# Patient Record
Sex: FEMALE | Race: WHITE | HISPANIC OR LATINO | Employment: FULL TIME | URBAN - METROPOLITAN AREA
[De-identification: names, ages, dates, MRNs, and addresses within clinical notes are randomized per-mention and may not be internally consistent; named-entity substitution may affect disease eponyms.]

---

## 2018-03-14 NOTE — PROGRESS NOTES
MAY 3 2016         RE: Cristiansaida Chencho                              To: Ranjeet Hall   MR#: [id number]   : [address] [place] [address] Road: [id number]                             Fax: [phone]   (Exam #: [id number])      The LMP of this 23year old,  G4, P2-0-1-2 patient was unknown, her   working CHUNG is 2016 and the current gestational age is 33 weeks 6   days by 1st Trimester Sono  A sonographic examination was performed on MAY   3 2016 using real time equipment  The ultrasound examination was performed   using abdominal technique  The patient has a BMI of 31 4  Her blood   pressure today was 107/72  Earliest ultrasound found in her record: 11/19/15  6w1d 16 CHUNG         Carine is a  4 para   She is on prenatal vitamins and Zoloft   25 mg daily  She denies any known drug allergies  Her past medical history   is significant for depression, elevated BMI and active herpes and history   of alcohol and drug abuse  She denies use of alcoho/ and drugs in this   pregnancy  She denies smoking in this pregnancy but the [address] room smelled   like smoke  Her prior pregnancies weighed 7 lbs 13 oz and 7 lbs  15 oz    with her last delivery in   Her prior daughter was delivered at 37   weeks in  due to an ovarian mass found in utero  This tumor was   removed after delivery and it was benign per Carine but she is not sure   what the tumor was called  She also has a first trimester  by d/e  In this pregnancy she had a normal [place] screen and declined the MSAFP  She also declined a flu shot  Patient has a history of frequent UTIs but   has never seen urology and has had several episodes of pneumonia with her   last episode in   She reports she had 2 uti's this pregnancy  She   denies any first generation history of diabetes, HTN, thrombosis or other   fetal anomalies        Cardiac motion was observed at 142 bpm       INDICATIONS      fetal anatomical survey   late transfer   obesity   maternal herpes simplex virus   drug exposure      Exam Types      LEVEL II      RESULTS      Fetus # 1 of 1   Vertex presentation   Fetal growth appeared normal   Placenta Location = Posterior   No placenta previa   Placenta Grade = I      MEASUREMENTS (* Included In Average GA)      AC              26 1 cm        30 weeks 1 day * (51%)   BPD              7 8 cm        31 weeks 1 day * (64%)   HC              29 9 cm        32 weeks 5 days* (85%)   Femur            5 8 cm        30 weeks 3 days* (53%)      Humerus          5 3 cm        30 weeks 5 days  (65%)   Radius           4 4 cm        32 weeks 1 day   Ulna             4 8 cm        30 weeks 4 days   Tibia            4 8 cm        29 weeks 1 day   (34%)   Fibula           5 1 cm        29 weeks 3 days   Foot             6 0 cm        30 weeks 1 day      Cerebellum       3 7 cm        31 weeks 6 days   Biorbit          4 6 cm        29 weeks 0 days   CisternaMagna    7 4 mm      HC/AC           1 15   FL/AC           0 22   FL/BPD          0 75   EFW (Ac/Fl/Hc)  1629 grams - 3 lbs 9 oz                 (58%)      THE AVERAGE GESTATIONAL AGE is 31 weeks 1 day +/- 18 days  AMNIOTIC FLUID      Q1: 4 2      Q2: 3 2      Q3: 3 7      Q4: 2 9   SOFY Total = 13 9 cm   Amniotic Fluid: Normal      ANATOMY      Head                                    Normal   Face/Neck                               Normal   Th  Cav  Normal   Heart                                   Normal   Abd  Cav  Normal   Stomach                                 Normal   Right Kidney                            Normal   Left Kidney                             Normal   Bladder                                 Normal   Abd   Wall                               Normal   Spine                                   Normal   Extrems                                 Normal   Genitalia                               Normal   Placenta Normal   Umbl  Cord                              Normal   Uterus                                  Normal   PCI                                     Normal      ANATOMY DETAILS      Visualized Appearing Sonographically Normal:   HEAD: (Calvarium, BPD Level, Lateral Ventricles, Choroid Plexus,   Cerebellum, Cisterna Magna);    FACE/NECK: (Neck, Nuchal Fold, Profile,   Orbits, Nose/Lips, Palate, Face);    TH  CAV : (Diaphragm); HEART: (3   Vessel Trachea, Four Chamber View, Proximal Left Outflow, Proximal Right   Outflow, Aortic Arch, Ductal Arch, Short Axis of Greater Vessels, Cardiac   Axis, Interventricular Septum, Interatrial Septum, Cardiac Position);      ABD  CAV , STOMACH, RIGHT KIDNEY, LEFT KIDNEY, BLADDER, ABD  WALL, SPINE:   (Cervical Spine, Thoracic Spine, Lumbar Spine, Sacrum);    EXTREMS: (Lt   Humerus, Rt Humerus, Lt Forearm, Rt Forearm, Lt Hand, Rt Hand, Lt Femur,   Rt Femur, Lt Low Leg, Rt Low Leg, Lt Foot, Rt Foot);    GENITALIA (Male),   PLACENTA, UMBL  CORD, UTERUS, PCI      ANATOMY COMMENTS      No fetal structural abnormality or ultrasound marker for aneuploidy is   identified on the Level II ultrasound study today  The genitalia were   reviewed and found to be male  The patient is aware of the results of the   fetal sex  Fetal growth and amniotic fluid volume appear normal   Active   movement of the fetal body & extremities was seen  There is no suspicion   of a subchorionic bleed  The placental cord insertion was normal       ADNEXA      The left ovary appeared normal and measured 3 0 x 2 8 x 1 7 cm with a   volume of 7 5 cc  The right ovary appeared normal and measured 3 1 x 1 8 x   2 5 cm with a volume of 7 3 cc        IMPRESSION      Nava IUP   31 weeks and 1 day by this ultrasound  (CHUNG=JUL 4 2016)   29 weeks and 6 days by [address] [place] [address] Sono  (CHUNG=JUL 13 2016)   Vertex presentation   Fetal growth appeared normal   Normal anatomy survey   Regular fetal heart rate of 142 bpm   Posterior placenta   No placenta previa      GENERAL COMMENT      As per your request, a consultation was performed on your patient for the   following indication: size greater then dates      The patient was informed of the findings and counseled about the   limitations of the exam in detecting all forms of fetal congenital   abnormalities  She denies any vaginal bleeding or uterine cramping/contractions  She does   feel fetal movement  Follow up recommended:   1  Recommend valtrex suppression at 36 weeks  2  Could offer macrodantin suppression ( 50 mg qhs) for frequent UTIs  3  She had a diabetes screen but she is unsure of the results and I do not   have the results in her records sent  4  No further US are recommended at this time  DIPTI Boyer M D     Maternal-Fetal Medicine   Electronically signed 05/03/16 20:34

## 2019-09-23 ENCOUNTER — HOSPITAL ENCOUNTER (EMERGENCY)
Facility: HOSPITAL | Age: 29
Discharge: HOME | End: 2019-09-23
Attending: EMERGENCY MEDICINE | Admitting: EMERGENCY MEDICINE
Payer: COMMERCIAL

## 2019-09-23 VITALS
OXYGEN SATURATION: 97 % | TEMPERATURE: 98.5 F | SYSTOLIC BLOOD PRESSURE: 116 MMHG | HEART RATE: 73 BPM | RESPIRATION RATE: 18 BRPM | DIASTOLIC BLOOD PRESSURE: 61 MMHG

## 2019-09-23 DIAGNOSIS — L50.9 URTICARIA: ICD-10-CM

## 2019-09-23 DIAGNOSIS — R21 RASH AND NONSPECIFIC SKIN ERUPTION: ICD-10-CM

## 2019-09-23 PROCEDURE — 99284 EMERGENCY DEPT VISIT MOD MDM: CPT | Performed by: EMERGENCY MEDICINE

## 2019-09-23 PROCEDURE — 99282 EMERGENCY DEPT VISIT SF MDM: CPT

## 2019-09-23 RX ORDER — TRIAMCINOLONE ACETONIDE 5 MG/G
CREAM TOPICAL 3 TIMES DAILY
COMMUNITY
Start: 2019-09-21 | End: 2022-07-11 | Stop reason: ALTCHOICE

## 2019-09-23 RX ORDER — CLOTRIMAZOLE 1 %
CREAM (GRAM) TOPICAL 2 TIMES DAILY
COMMUNITY
Start: 2019-09-21 | End: 2022-07-11 | Stop reason: ALTCHOICE

## 2019-09-23 RX ORDER — PREDNISONE 20 MG/1
40 TABLET ORAL DAILY
Qty: 10 TABLET | Refills: 0 | Status: SHIPPED | OUTPATIENT
Start: 2019-09-23 | End: 2022-07-11 | Stop reason: ALTCHOICE

## 2019-09-23 RX ORDER — PREDNISONE 20 MG/1
40 TABLET ORAL ONCE
Status: COMPLETED | OUTPATIENT
Start: 2019-09-23 | End: 2019-09-23

## 2019-09-23 RX ADMIN — PREDNISONE 40 MG: 20 TABLET ORAL at 19:56

## 2019-09-23 NOTE — ED PROVIDER NOTES
History  Chief Complaint   Patient presents with    Rash     pt presents to ed with rash on right lower back, went to [facility] and has been using triamcinolone acetonide  pt reprts it not getting better and hurting     Pt presents to ED for evaluation of a pruritic erythematous rash which has been present for almost two weeks  She notes that it is not painful but it itches  She was prescribed triamcinolone cream which she has been applying to the rash without relief of sxs  She denies systemic illness  She denies known precipitant  She denies aggravating or alleviating factors  She has no h/o similar sxs in past  She is currently symptomatic  Prior to Admission Medications   Prescriptions Last Dose Informant Patient Reported? Taking? clotrimazole (LOTRIMIN) 1 % cream   Yes Yes   Sig: Apply topically 2 (two) times a day   triamcinolone (KENALOG) 0 5 % cream   Yes Yes   Sig: Apply topically Three times a day      Facility-Administered Medications: None       History reviewed  No pertinent past medical history  Past Surgical History:   Procedure Laterality Date    TUBAL LIGATION         History reviewed  No pertinent family history  I have reviewed and agree with the history as documented  Social History     Tobacco Use    Smoking status: Never Smoker    Smokeless tobacco: Never Used   Substance Use Topics    Alcohol use: No    Drug use: No        Review of Systems   Constitutional: Negative for chills and fever  Skin: Positive for rash and wound  All other systems reviewed and are negative  Physical Exam  Physical Exam   Constitutional: She is oriented to person, place, and time  She appears well-developed and well-nourished  No distress  HENT:   Head: Normocephalic and atraumatic  Eyes: Pupils are equal, round, and reactive to light  Conjunctivae and EOM are normal  Right eye exhibits no discharge  Left eye exhibits no discharge  Neck: Normal range of motion  Neck supple   No JVD present  Pulmonary/Chest: No stridor  Musculoskeletal: Normal range of motion  She exhibits no edema, tenderness or deformity  Neurological: She is alert and oriented to person, place, and time  No cranial nerve deficit or sensory deficit  She exhibits normal muscle tone  Coordination normal    Skin: Skin is warm and dry  Capillary refill takes less than 2 seconds  Rash noted  She is not diaphoretic  There is a scaling erythematous blanching raised rash on the lower R back  There is no surrounding cellulitis  There is no induration or crepitus or fluctuance  Nursing note and vitals reviewed  Vital Signs  ED Triage Vitals [09/23/19 1857]   Temperature Pulse Respirations Blood Pressure SpO2   98 5 °F (36 9 °C) 73 18 116/61 97 %      Temp Source Heart Rate Source Patient Position - Orthostatic VS BP Location FiO2 (%)   Oral Monitor Sitting Left arm --      Pain Score       No Pain           Vitals:    09/23/19 1857   BP: 116/61   Pulse: 73   Patient Position - Orthostatic VS: Sitting         Visual Acuity      ED Medications  Medications   predniSONE tablet 40 mg (40 mg Oral Given 9/23/19 1956)       Diagnostic Studies  Results Reviewed     None                 No orders to display              Procedures  Procedures       ED Course                               MDM  Number of Diagnoses or Management Options  Rash and nonspecific skin eruption:   Urticaria:   Diagnosis management comments: Itching urticarial rash  No relief from steroid cream  Will tx w systemic steroids   rted if new or worsening sxs, fever, chills, etc        Disposition  Final diagnoses:   Urticaria   Rash and nonspecific skin eruption     Time reflects when diagnosis was documented in both MDM as applicable and the Disposition within this note     Time User Action Codes Description Comment    9/23/2019  7:47 PM Alma Villalpando Add [L50 9] Urticaria     9/23/2019  7:47 PM Donta Mathis Add [R21] Rash and nonspecific skin eruption 9/23/2019  7:47 PM Magdalena García Modify [L50 9] Urticaria       ED Disposition     ED Disposition Condition Date/Time Comment    Discharge Stable Mon Sep 23, 2019  7:47 PM Damaris Pagan discharge to home/self care  Follow-up Information     Follow up With Specialties Details Why Contact Info Additional Information    [address] Emergency Department Emergency Medicine  If symptoms worsen [facility] ED, [address] [place] [address], [id number]          Discharge Medication List as of 9/23/2019  7:47 PM      START taking these medications    Details   predniSONE 20 mg tablet Take 2 tablets (40 mg total) by mouth daily, Starting Mon 9/23/2019, Print           No discharge procedures on file      ED Provider  Electronically Signed by           Kyle Acosta MD  09/23/19 7714

## 2021-09-19 ENCOUNTER — IMMUNIZATIONS (OUTPATIENT)
Dept: FAMILY MEDICINE CLINIC | Facility: HOSPITAL | Age: 31
End: 2021-09-19

## 2021-09-19 DIAGNOSIS — Z23 ENCOUNTER FOR IMMUNIZATION: Primary | ICD-10-CM

## 2021-09-19 PROCEDURE — 91300 SARS-COV-2 / COVID-19 MRNA VACCINE (PFIZER-BIONTECH) 30 MCG: CPT

## 2021-09-19 PROCEDURE — 0001A SARS-COV-2 / COVID-19 MRNA VACCINE (PFIZER-BIONTECH) 30 MCG: CPT

## 2021-10-19 ENCOUNTER — IMMUNIZATIONS (OUTPATIENT)
Dept: FAMILY MEDICINE CLINIC | Facility: HOSPITAL | Age: 31
End: 2021-10-19

## 2021-10-19 DIAGNOSIS — Z23 ENCOUNTER FOR IMMUNIZATION: Primary | ICD-10-CM

## 2021-10-19 PROCEDURE — 91300 SARS-COV-2 / COVID-19 MRNA VACCINE (PFIZER-BIONTECH) 30 MCG: CPT

## 2021-10-19 PROCEDURE — 0002A SARS-COV-2 / COVID-19 MRNA VACCINE (PFIZER-BIONTECH) 30 MCG: CPT

## 2021-11-03 ENCOUNTER — TELEPHONE (OUTPATIENT)
Dept: FAMILY MEDICINE CLINIC | Facility: CLINIC | Age: 31
End: 2021-11-03

## 2022-04-12 ENCOUNTER — TELEPHONE (OUTPATIENT)
Dept: PSYCHIATRY | Facility: CLINIC | Age: 32
End: 2022-04-12

## 2022-06-14 ENCOUNTER — HOSPITAL ENCOUNTER (EMERGENCY)
Facility: HOSPITAL | Age: 32
Discharge: HOME | End: 2022-06-14
Attending: EMERGENCY MEDICINE
Payer: COMMERCIAL

## 2022-06-14 VITALS
HEIGHT: 64 IN | TEMPERATURE: 97.4 F | OXYGEN SATURATION: 99 % | RESPIRATION RATE: 20 BRPM | SYSTOLIC BLOOD PRESSURE: 118 MMHG | HEART RATE: 67 BPM | BODY MASS INDEX: 31.58 KG/M2 | DIASTOLIC BLOOD PRESSURE: 76 MMHG | WEIGHT: 185 LBS

## 2022-06-14 DIAGNOSIS — L03.90 CELLULITIS: Primary | ICD-10-CM

## 2022-06-14 PROCEDURE — 99284 EMERGENCY DEPT VISIT MOD MDM: CPT | Performed by: EMERGENCY MEDICINE

## 2022-06-14 PROCEDURE — 99283 EMERGENCY DEPT VISIT LOW MDM: CPT

## 2022-06-14 RX ORDER — CEPHALEXIN 250 MG/1
500 CAPSULE ORAL ONCE
Status: COMPLETED | OUTPATIENT
Start: 2022-06-14 | End: 2022-06-14

## 2022-06-14 RX ORDER — CEPHALEXIN 250 MG/1
500 CAPSULE ORAL 4 TIMES DAILY
Qty: 56 CAPSULE | Refills: 0 | Status: SHIPPED | OUTPATIENT
Start: 2022-06-14 | End: 2022-06-21

## 2022-06-14 RX ORDER — FLUCONAZOLE 150 MG/1
150 TABLET ORAL ONCE
Status: COMPLETED | OUTPATIENT
Start: 2022-06-14 | End: 2022-06-14

## 2022-06-14 RX ADMIN — CEPHALEXIN 500 MG: 250 CAPSULE ORAL at 11:18

## 2022-06-14 RX ADMIN — FLUCONAZOLE 150 MG: 150 TABLET ORAL at 11:18

## 2022-06-14 NOTE — ED PROVIDER NOTES
History  Chief Complaint   Patient presents with    Abscess     Pt presents with c/o abscess in groin, noticed 2 month ago      HPI  28 yo F presents with R buttocks pain/swelling for the past month which has been worsening, aching and constant  She has tried compresses without improvement  No fevers or chills  Prior to Admission Medications   Prescriptions Last Dose Informant Patient Reported? Taking? clotrimazole (LOTRIMIN) 1 % cream   Yes No   Sig: Apply topically 2 (two) times a day   predniSONE 20 mg tablet   No No   Sig: Take 2 tablets (40 mg total) by mouth daily   triamcinolone (KENALOG) 0 5 % cream   Yes No   Sig: Apply topically Three times a day      Facility-Administered Medications: None       History reviewed  No pertinent past medical history  Past Surgical History:   Procedure Laterality Date    TUBAL LIGATION         History reviewed  No pertinent family history  I have reviewed and agree with the history as documented  E-Cigarette/Vaping     E-Cigarette/Vaping Substances     Social History     Tobacco Use    Smoking status: Never Smoker    Smokeless tobacco: Never Used   Substance Use Topics    Alcohol use: No    Drug use: No       Review of Systems   Constitutional: Negative for chills and fever  HENT: Negative for dental problem and ear pain  Eyes: Negative for pain and redness  Respiratory: Negative for cough and shortness of breath  Cardiovascular: Negative for chest pain and palpitations  Gastrointestinal: Negative for abdominal pain and nausea  Endocrine: Negative for polydipsia and polyphagia  Genitourinary: Negative for dysuria and frequency  Musculoskeletal: Negative for arthralgias and joint swelling  Skin: Negative for color change and rash  +buttocks pain and swelling   Neurological: Negative for dizziness and headaches  Psychiatric/Behavioral: Negative for behavioral problems and confusion     All other systems reviewed and are negative  Physical Exam  Physical Exam  Vitals and nursing note reviewed  Constitutional:       General: She is not in acute distress  HENT:      Head: Normocephalic and atraumatic  Right Ear: External ear normal       Left Ear: External ear normal       Nose: Nose normal    Eyes:      General: No scleral icterus  Cardiovascular:      Rate and Rhythm: Normal rate  Pulmonary:      Effort: Pulmonary effort is normal  No respiratory distress  Abdominal:      General: There is no distension  Musculoskeletal:         General: No deformity  Normal range of motion  Comments: R inferior buttocks with edema and tenderness, no induration or fluctuance   Skin:     Findings: No rash  Neurological:      General: No focal deficit present  Mental Status: She is alert  Gait: Gait normal    Psychiatric:         Mood and Affect: Mood normal          Vital Signs  ED Triage Vitals [06/14/22 1002]   Temperature Pulse Respirations Blood Pressure SpO2   (!) 97 4 °F (36 3 °C) 67 20 118/76 99 %      Temp Source Heart Rate Source Patient Position - Orthostatic VS BP Location FiO2 (%)   Oral Monitor Sitting Left arm --      Pain Score       --           Vitals:    06/14/22 1002   BP: 118/76   Pulse: 67   Patient Position - Orthostatic VS: Sitting         Visual Acuity      ED Medications  Medications   cephalexin (KEFLEX) capsule 500 mg (500 mg Oral Given 6/14/22 1118)   fluconazole (DIFLUCAN) tablet 150 mg (150 mg Oral Given 6/14/22 1118)       Diagnostic Studies  Results Reviewed     None                 No orders to display              Procedures  Procedures         ED Course                               SBIRT 22yo+    Flowsheet Row Most Recent Value   SBIRT (23 yo +)    In order to provide better care to our patients, we are screening all of our patients for alcohol and drug use  Would it be okay to ask you these screening questions?  Yes Filed at: 06/14/2022 1102   Initial Alcohol Screen: US AUDIT-C 1  How often do you have a drink containing alcohol? 1 Filed at: 06/14/2022 1102   2  How many drinks containing alcohol do you have on a typical day you are drinking? 1 Filed at: 06/14/2022 1102   3b  FEMALE Any Age, or MALE 65+: How often do you have 4 or more drinks on one occassion? 0 Filed at: 06/14/2022 1102   Audit-C Score 2 Filed at: 06/14/2022 1102   LOUIE: How many times in the past year have you    Used an illegal drug or used a prescription medication for non-medical reasons? Never Filed at: 06/14/2022 1102                    MDM  Patient presents with buttocks swelling  Bedside US shows cobblestoning of tissue but no fluid pocket, will defer incision and drainage at this time and treat for cellulitis  Disposition  Final diagnoses:   Cellulitis     Time reflects when diagnosis was documented in both MDM as applicable and the Disposition within this note     Time User Action Codes Description Comment    6/14/2022 11:11 AM Freida Wang [L03 90] Cellulitis       ED Disposition     ED Disposition   Discharge    Condition   Stable    Date/Time   Tue Jun 14, 2022 11:11 AM    Comment   Jl Mcmahon discharge to home/self care                 Follow-up Information     Follow up With Specialties Details Why Contact Info    Hafsa Arechiga MD Internal Medicine Schedule an appointment as soon as possible for a visit   [address] [place] [address]  [phone]            Discharge Medication List as of 6/14/2022 11:12 AM      START taking these medications    Details   cephalexin (KEFLEX) 250 mg capsule Take 2 capsules (500 mg total) by mouth 4 (four) times a day for 7 days, Starting Tue 6/14/2022, Until Tue 6/21/2022, Print         CONTINUE these medications which have NOT CHANGED    Details   clotrimazole (LOTRIMIN) 1 % cream Apply topically 2 (two) times a day, Starting Sat 9/21/2019, Until Sat 10/5/2019, Historical Med      predniSONE 20 mg tablet Take 2 tablets (40 mg total) by mouth daily, Starting Mon 9/23/2019, Print      triamcinolone (KENALOG) 0 5 % cream Apply topically Three times a day, Starting Sat 9/21/2019, Until Sat 10/5/2019, Historical Med             No discharge procedures on file      PDMP Review     None          ED Provider  Electronically Signed by           Carli Kincaid MD  06/14/22 3085

## 2022-06-14 NOTE — DISCHARGE INSTRUCTIONS
Take antibiotics as prescribed and continue with compresses, follow up with primary care doctor for recheck

## 2022-06-28 ENCOUNTER — OFFICE VISIT (OUTPATIENT)
Dept: INTERNAL MEDICINE CLINIC | Facility: CLINIC | Age: 32
End: 2022-06-28
Payer: COMMERCIAL

## 2022-06-28 VITALS
BODY MASS INDEX: 29.09 KG/M2 | TEMPERATURE: 97.8 F | WEIGHT: 170.4 LBS | DIASTOLIC BLOOD PRESSURE: 72 MMHG | RESPIRATION RATE: 18 BRPM | HEIGHT: 64 IN | HEART RATE: 67 BPM | SYSTOLIC BLOOD PRESSURE: 114 MMHG | OXYGEN SATURATION: 97 %

## 2022-06-28 DIAGNOSIS — Z23 ENCOUNTER FOR IMMUNIZATION: ICD-10-CM

## 2022-06-28 DIAGNOSIS — Z12.4 SCREENING FOR CERVICAL CANCER: ICD-10-CM

## 2022-06-28 DIAGNOSIS — L72.9 CYST OF BUTTOCKS: Primary | ICD-10-CM

## 2022-06-28 DIAGNOSIS — D17.24 LIPOMA OF LEFT LOWER EXTREMITY: ICD-10-CM

## 2022-06-28 PROCEDURE — 99214 OFFICE O/P EST MOD 30 MIN: CPT

## 2022-06-28 PROCEDURE — 1036F TOBACCO NON-USER: CPT

## 2022-06-28 PROCEDURE — 3008F BODY MASS INDEX DOCD: CPT

## 2022-06-28 RX ORDER — CETIRIZINE HYDROCHLORIDE 10 MG/1
10 TABLET ORAL DAILY
COMMUNITY
Start: 2022-05-17 | End: 2022-07-11 | Stop reason: ALTCHOICE

## 2022-06-28 RX ORDER — MULTIVIT-MIN/IRON FUM/FOLIC AC 7.5 MG-4
1 TABLET ORAL DAILY
COMMUNITY

## 2022-06-28 RX ORDER — CEPHALEXIN 250 MG/1
CAPSULE ORAL
COMMUNITY
Start: 2022-06-14 | End: 2022-07-11 | Stop reason: ALTCHOICE

## 2022-06-28 NOTE — PROGRESS NOTES
INTERNAL MEDICINE FOLLOW-UP VISIT  [facility]    NAME: James Torres  AGE: 29 y o  SEX: female  : 1990     DATE: 2022     Assessment and Plan:   1  Cyst of buttocks  2 week hx of fluid filled sac to right groin/buttock region  Denies any pressure or trauma to this area  Was treated at urgent care with Keflex with no improvement  - US extremity soft tissue; Future        No follow-ups on file  Chief Complaint:     Chief Complaint   Patient presents with    Establish Care     Cyst on the inner thigh      History of Present Illness:     Patient presents with a 2 week history of a fluid filled lesion to her right groin/ outer buttock region  She states that it is tender to the touch  She denies any trauma to this area  She went to urgent care and was placed on keflex with no improvement  The following portions of the patient's history were reviewed and updated as appropriate: allergies, current medications, past family history, past medical history, past social history, past surgical history and problem list      Review of Systems:     Review of Systems   Constitutional: Negative for appetite change, chills, diaphoresis, fatigue, fever and unexpected weight change  HENT: Negative for postnasal drip and sneezing  Eyes: Negative for visual disturbance  Respiratory: Negative for chest tightness and shortness of breath  Cardiovascular: Negative for chest pain, palpitations and leg swelling  Gastrointestinal: Negative for abdominal pain and blood in stool  Endocrine: Negative for cold intolerance, heat intolerance, polydipsia, polyphagia and polyuria  Genitourinary: Negative for difficulty urinating, dysuria, frequency and urgency  Musculoskeletal: Negative for arthralgias and myalgias  Skin: Positive for wound  Negative for rash  Neurological: Negative for dizziness, weakness, light-headedness and headaches  Hematological: Negative for adenopathy  Psychiatric/Behavioral: Negative for confusion, dysphoric mood and sleep disturbance  The patient is not nervous/anxious  Past Medical History:   History reviewed  No pertinent past medical history  Current Medications:     Current Outpatient Medications:     cephalexin (KEFLEX) 250 mg capsule, , Disp: , Rfl:     cetirizine (ZyrTEC) 10 mg tablet, Take 10 mg by mouth daily, Disp: , Rfl:     Multiple Vitamins-Minerals (multivitamin with minerals) tablet, Take 1 tablet by mouth daily, Disp: , Rfl:     clotrimazole (LOTRIMIN) 1 % cream, Apply topically 2 (two) times a day (Patient not taking: Reported on 6/28/2022), Disp: , Rfl:     predniSONE 20 mg tablet, Take 2 tablets (40 mg total) by mouth daily (Patient not taking: No sig reported), Disp: 10 tablet, Rfl: 0    triamcinolone (KENALOG) 0 5 % cream, Apply topically Three times a day (Patient not taking: Reported on 6/28/2022), Disp: , Rfl:      Allergies:   No Known Allergies     Physical Exam:     /72 (BP Location: Left arm, Patient Position: Sitting, Cuff Size: Standard)   Pulse 67   Temp 97 8 °F (36 6 °C) (Temporal) Comment: no nsaids  Resp 18   Ht 5' 4" (1 626 m)   Wt 77 3 kg (170 lb 6 4 oz)   SpO2 97%   BMI 29 25 kg/m²     Physical Exam  Constitutional:       Appearance: She is well-developed  HENT:      Head: Normocephalic and atraumatic  Eyes:      Pupils: Pupils are equal, round, and reactive to light  Neck:      Thyroid: No thyromegaly  Cardiovascular:      Rate and Rhythm: Normal rate and regular rhythm  Heart sounds: No murmur heard  Pulmonary:      Effort: Pulmonary effort is normal       Breath sounds: Normal breath sounds  Abdominal:      General: Bowel sounds are normal       Palpations: Abdomen is soft  Musculoskeletal:         General: Normal range of motion  Cervical back: Normal range of motion and neck supple     Lymphadenopathy:      Cervical: No cervical adenopathy  Skin:     General: Skin is warm and dry  Findings: Lesion present  Comments: Right groin inner buttock region   Neurological:      Mental Status: She is alert and oriented to person, place, and time            LEI Arana  [facility] [address] [place] [address]

## 2022-06-28 NOTE — LETTER
June 29, 2022     Patient: Ami Allen  YOB: 1990  Date of Visit: 6/28/2022      To Whom it May Concern:    Ami Allen is under my professional care  Marcello He was seen in my office on 6/28/2022  Marcello He may return to work on 06/30/2022  If you have any questions or concerns, please don't hesitate to call           Sincerely,          LEI Bullock        CC: No Recipients

## 2022-06-29 ENCOUNTER — APPOINTMENT (OUTPATIENT)
Dept: LAB | Facility: CLINIC | Age: 32
End: 2022-06-29
Payer: COMMERCIAL

## 2022-06-29 ENCOUNTER — HOSPITAL ENCOUNTER (OUTPATIENT)
Dept: ULTRASOUND IMAGING | Facility: HOSPITAL | Age: 32
Discharge: HOME | End: 2022-06-29
Payer: COMMERCIAL

## 2022-06-29 DIAGNOSIS — N89.8 CYST OF VAGINA: ICD-10-CM

## 2022-06-29 DIAGNOSIS — L72.9 CYST OF BUTTOCKS: ICD-10-CM

## 2022-06-29 DIAGNOSIS — D17.23 LIPOMA OF RIGHT LOWER EXTREMITY: ICD-10-CM

## 2022-06-29 DIAGNOSIS — L72.9 CYST OF BUTTOCKS: Primary | ICD-10-CM

## 2022-06-29 LAB
BASOPHILS # BLD AUTO: 0.07 THOUSANDS/ΜL (ref 0–0.1)
BASOPHILS NFR BLD AUTO: 1 % (ref 0–1)
CRP SERPL QL: <3 MG/L
EOSINOPHIL # BLD AUTO: 0.1 THOUSAND/ΜL (ref 0–0.61)
EOSINOPHIL NFR BLD AUTO: 1 % (ref 0–6)
ERYTHROCYTE [DISTWIDTH] IN BLOOD BY AUTOMATED COUNT: 11.9 % (ref 11.6–15.1)
ERYTHROCYTE [SEDIMENTATION RATE] IN BLOOD: 12 MM/HOUR (ref 0–19)
HCT VFR BLD AUTO: 38.2 % (ref 34.8–46.1)
HGB BLD-MCNC: 12.5 G/DL (ref 11.5–15.4)
IMM GRANULOCYTES # BLD AUTO: 0.03 THOUSAND/UL (ref 0–0.2)
IMM GRANULOCYTES NFR BLD AUTO: 0 % (ref 0–2)
LYMPHOCYTES # BLD AUTO: 1.88 THOUSANDS/ΜL (ref 0.6–4.47)
LYMPHOCYTES NFR BLD AUTO: 23 % (ref 14–44)
MCH RBC QN AUTO: 32.8 PG (ref 26.8–34.3)
MCHC RBC AUTO-ENTMCNC: 32.7 G/DL (ref 31.4–37.4)
MCV RBC AUTO: 100 FL (ref 82–98)
MONOCYTES # BLD AUTO: 0.78 THOUSAND/ΜL (ref 0.17–1.22)
MONOCYTES NFR BLD AUTO: 10 % (ref 4–12)
NEUTROPHILS # BLD AUTO: 5.3 THOUSANDS/ΜL (ref 1.85–7.62)
NEUTS SEG NFR BLD AUTO: 65 % (ref 43–75)
NRBC BLD AUTO-RTO: 0 /100 WBCS
PLATELET # BLD AUTO: 384 THOUSANDS/UL (ref 149–390)
PMV BLD AUTO: 9.2 FL (ref 8.9–12.7)
RBC # BLD AUTO: 3.81 MILLION/UL (ref 3.81–5.12)
WBC # BLD AUTO: 8.16 THOUSAND/UL (ref 4.31–10.16)

## 2022-06-29 PROCEDURE — 80048 BASIC METABOLIC PNL TOTAL CA: CPT

## 2022-06-29 PROCEDURE — 76882 US LMTD JT/FCL EVL NVASC XTR: CPT

## 2022-06-29 PROCEDURE — 86140 C-REACTIVE PROTEIN: CPT

## 2022-06-29 PROCEDURE — 36415 COLL VENOUS BLD VENIPUNCTURE: CPT

## 2022-06-29 PROCEDURE — 85652 RBC SED RATE AUTOMATED: CPT

## 2022-06-29 PROCEDURE — 85025 COMPLETE CBC W/AUTO DIFF WBC: CPT

## 2022-06-30 ENCOUNTER — TELEPHONE (OUTPATIENT)
Dept: INTERNAL MEDICINE CLINIC | Facility: CLINIC | Age: 32
End: 2022-06-30

## 2022-07-01 ENCOUNTER — CONSULT (OUTPATIENT)
Dept: SURGERY | Facility: CLINIC | Age: 32
End: 2022-07-01
Payer: COMMERCIAL

## 2022-07-01 VITALS
HEART RATE: 71 BPM | WEIGHT: 175 LBS | TEMPERATURE: 98.5 F | OXYGEN SATURATION: 99 % | BODY MASS INDEX: 29.88 KG/M2 | DIASTOLIC BLOOD PRESSURE: 76 MMHG | SYSTOLIC BLOOD PRESSURE: 118 MMHG | HEIGHT: 64 IN

## 2022-07-01 DIAGNOSIS — D17.23 LIPOMA OF RIGHT LOWER EXTREMITY: Primary | ICD-10-CM

## 2022-07-01 LAB
ANION GAP SERPL CALCULATED.3IONS-SCNC: 6 MMOL/L (ref 4–13)
BUN SERPL-MCNC: 13 MG/DL (ref 5–25)
CALCIUM SERPL-MCNC: 8.8 MG/DL (ref 8.3–10.1)
CHLORIDE SERPL-SCNC: 106 MMOL/L (ref 100–108)
CO2 SERPL-SCNC: 27 MMOL/L (ref 21–32)
CREAT SERPL-MCNC: 0.56 MG/DL (ref 0.6–1.3)
GFR SERPL CREATININE-BSD FRML MDRD: 125 ML/MIN/1.73SQ M
GLUCOSE SERPL-MCNC: 66 MG/DL (ref 65–140)
POTASSIUM SERPL-SCNC: 4 MMOL/L (ref 3.5–5.3)
SODIUM SERPL-SCNC: 139 MMOL/L (ref 136–145)

## 2022-07-01 PROCEDURE — 99244 OFF/OP CNSLTJ NEW/EST MOD 40: CPT | Performed by: STUDENT IN AN ORGANIZED HEALTH CARE EDUCATION/TRAINING PROGRAM

## 2022-07-01 RX ORDER — HEPARIN SODIUM 5000 [USP'U]/ML
5000 INJECTION, SOLUTION INTRAVENOUS; SUBCUTANEOUS ONCE
Status: CANCELLED | OUTPATIENT
Start: 2022-07-01 | End: 2022-07-01

## 2022-07-01 RX ORDER — CHLORHEXIDINE GLUCONATE 4 G/100ML
SOLUTION TOPICAL DAILY PRN
Status: CANCELLED | OUTPATIENT
Start: 2022-07-01

## 2022-07-01 NOTE — H&P (VIEW-ONLY)
Assessment/Plan:  82-year-old female with lipoma of right proximal medial thigh  -patient presents with mass of proximal medial thigh  -states 3 weeks ago she noticed a bump on her proximal medial thigh that caused her discomfort, noted it initially after working out and running  -states any time that it is warm and with physical activity this causes her discomfort, most likely from friction between the mass and her other thigh  -denies any drainage  -patient also states she notices that her right leg will intermittently give out from time to time, this occurs randomly  - on exam there is a roughly 6 x 5 cm subcutaneous mass which is freely mobile in the subcutaneous tissue, palpation of the mass does not cause any lower extremity symptoms  -ultrasound of the right thigh from 06/29/2022 report and images reviewed, this was also reviewed with the patient  -CMP from 06/29/2022 reviewed  -primary care physician note from 06/28/2022 reviewed  -will plan for excision right proximal thigh lipoma under general anesthesia/LMA  -BMP ordered, can add this on to pre-existing blood work  -I do not believe the proximal thigh lipoma is causing the patient's right leg to give out, a mass is noted to be in the subcutaneous tissue and on ultrasound does not appear to go any deeper, palpation of the mass does not reproduce any lower extremity symptoms    All risks, benefits, alternatives of the procedure were discussed at length with the patient  Risks include bleeding, infection, damage to surrounding structures, recurrence  All questions were answered to satisfaction  The patient voiced understanding and signed consent  1  Lipoma of right lower extremity  -     Ambulatory Referral to General Surgery  -     Case request operating room: EXCISION BIOPSY TISSUE LESION/MASS LOWER EXTREMITY; Standing  -     Basic metabolic panel;  Future  -     Case request operating room: EXCISION BIOPSY TISSUE LESION/MASS LOWER EXTREMITY Subjective:      Patient ID: Kenny Ybarra is a 29 y o  female  Triage Notes:    Patient is a 82-year-old female who presents to office for evaluation of proximal right medial thigh mass  Patient states roughly 3 weeks ago she noticed a mass on her inner thigh  She was working out and noticed it caused discomfort with her running  She also states she notices when it is very warm and with movement the mass causes friction and pain  It does not cause her discomfort if it is not moved  Patient states she does intermittently feel like her right leg gives out but this happens intermittently and is not associated with palpation of the mass  The following portions of the patient's history were reviewed and updated as appropriate:   She  has no past medical history on file  She   Patient Active Problem List    Diagnosis Date Noted    Lipoma of right lower extremity 07/01/2022    Cyst of buttocks 06/28/2022     She  has a past surgical history that includes Tubal ligation  Her family history is not on file  She  reports that she has never smoked  She has never used smokeless tobacco  She reports current alcohol use  She reports that she does not use drugs    Current Outpatient Medications on File Prior to Visit   Medication Sig    Multiple Vitamins-Minerals (multivitamin with minerals) tablet Take 1 tablet by mouth daily    cephalexin (KEFLEX) 250 mg capsule  (Patient not taking: Reported on 7/1/2022)    cetirizine (ZyrTEC) 10 mg tablet Take 10 mg by mouth daily (Patient not taking: Reported on 7/1/2022)    clotrimazole (LOTRIMIN) 1 % cream Apply topically 2 (two) times a day (Patient not taking: Reported on 6/28/2022)    predniSONE 20 mg tablet Take 2 tablets (40 mg total) by mouth daily (Patient not taking: No sig reported)    triamcinolone (KENALOG) 0 5 % cream Apply topically Three times a day (Patient not taking: Reported on 6/28/2022)     No current facility-administered medications on file prior to visit  She has No Known Allergies       Review of Systems   Constitutional: Negative for chills, fatigue and fever  HENT: Negative for congestion, hearing loss, rhinorrhea and sore throat  Eyes: Negative for pain and discharge  Respiratory: Negative for cough, chest tightness and shortness of breath  Cardiovascular: Negative for chest pain and palpitations  Gastrointestinal: Negative for abdominal pain, constipation, diarrhea, nausea and vomiting  Endocrine: Negative for cold intolerance and heat intolerance  Genitourinary: Negative for difficulty urinating and dysuria  Musculoskeletal: Negative for back pain and neck pain  Positive proximal right thigh mass   Skin: Negative for color change and rash  Allergic/Immunologic: Negative for environmental allergies and food allergies  Neurological: Negative for seizures and headaches  Hematological: Negative for adenopathy  Does not bruise/bleed easily  Psychiatric/Behavioral: Negative for confusion and hallucinations  Objective:      /76 (BP Location: Left arm, Patient Position: Sitting, Cuff Size: Adult)   Pulse 71   Temp 98 5 °F (36 9 °C) (Oral)   Ht 5' 4" (1 626 m)   Wt 79 4 kg (175 lb)   SpO2 99%   BMI 30 04 kg/m²     Below is the patient's most recent value for Albumin, ALT, AST, BUN, Calcium, Chloride, Cholesterol, CO2, Creatinine, GFR, Glucose, HDL, Hematocrit, Hemoglobin, Hemoglobin A1C, LDL, Magnesium, Phosphorus, Platelets, Potassium, PSA, Sodium, Triglycerides, and WBC  Lab Results   Component Value Date    HCT 38 2 06/29/2022    HGB 12 5 06/29/2022     06/29/2022    WBC 8 16 06/29/2022     Note: for a comprehensive list of the patient's lab results, access the Results Review activity  Physical Exam  Exam conducted with a chaperone present (NIKIA Delgado)  Constitutional:       Appearance: Normal appearance  HENT:      Head: Normocephalic and atraumatic        Nose: Nose normal    Eyes:      General: No scleral icterus  Conjunctiva/sclera: Conjunctivae normal    Cardiovascular:      Rate and Rhythm: Normal rate and regular rhythm  Heart sounds: Normal heart sounds  Pulmonary:      Effort: Pulmonary effort is normal       Breath sounds: Normal breath sounds  Abdominal:      General: There is no distension  Palpations: Abdomen is soft  Tenderness: There is no abdominal tenderness  Musculoskeletal:         General: No signs of injury  Skin:     General: Skin is warm  Coloration: Skin is not jaundiced  Comments: There is a subcutaneous mass in the right proximal medial thigh which is roughly 6 x 5 cm, no signs of infection, this is freely mobile in the subcutaneous tissue   Neurological:      General: No focal deficit present  Mental Status: She is alert and oriented to person, place, and time     Psychiatric:         Mood and Affect: Mood normal          Behavior: Behavior normal

## 2022-07-01 NOTE — PROGRESS NOTES
Assessment/Plan:  82-year-old female with lipoma of right proximal medial thigh  -patient presents with mass of proximal medial thigh  -states 3 weeks ago she noticed a bump on her proximal medial thigh that caused her discomfort, noted it initially after working out and running  -states any time that it is warm and with physical activity this causes her discomfort, most likely from friction between the mass and her other thigh  -denies any drainage  -patient also states she notices that her right leg will intermittently give out from time to time, this occurs randomly  - on exam there is a roughly 6 x 5 cm subcutaneous mass which is freely mobile in the subcutaneous tissue, palpation of the mass does not cause any lower extremity symptoms  -ultrasound of the right thigh from 06/29/2022 report and images reviewed, this was also reviewed with the patient  -CMP from 06/29/2022 reviewed  -primary care physician note from 06/28/2022 reviewed  -will plan for excision right proximal thigh lipoma under general anesthesia/LMA  -BMP ordered, can add this on to pre-existing blood work  -I do not believe the proximal thigh lipoma is causing the patient's right leg to give out, a mass is noted to be in the subcutaneous tissue and on ultrasound does not appear to go any deeper, palpation of the mass does not reproduce any lower extremity symptoms    All risks, benefits, alternatives of the procedure were discussed at length with the patient  Risks include bleeding, infection, damage to surrounding structures, recurrence  All questions were answered to satisfaction  The patient voiced understanding and signed consent  1  Lipoma of right lower extremity  -     Ambulatory Referral to General Surgery  -     Case request operating room: EXCISION BIOPSY TISSUE LESION/MASS LOWER EXTREMITY; Standing  -     Basic metabolic panel;  Future  -     Case request operating room: EXCISION BIOPSY TISSUE LESION/MASS LOWER EXTREMITY Subjective:      Patient ID: Hattie Patel is a 29 y o  female  Triage Notes:    Patient is a 70-year-old female who presents to office for evaluation of proximal right medial thigh mass  Patient states roughly 3 weeks ago she noticed a mass on her inner thigh  She was working out and noticed it caused discomfort with her running  She also states she notices when it is very warm and with movement the mass causes friction and pain  It does not cause her discomfort if it is not moved  Patient states she does intermittently feel like her right leg gives out but this happens intermittently and is not associated with palpation of the mass  The following portions of the patient's history were reviewed and updated as appropriate:   She  has no past medical history on file  She   Patient Active Problem List    Diagnosis Date Noted    Lipoma of right lower extremity 07/01/2022    Cyst of buttocks 06/28/2022     She  has a past surgical history that includes Tubal ligation  Her family history is not on file  She  reports that she has never smoked  She has never used smokeless tobacco  She reports current alcohol use  She reports that she does not use drugs    Current Outpatient Medications on File Prior to Visit   Medication Sig    Multiple Vitamins-Minerals (multivitamin with minerals) tablet Take 1 tablet by mouth daily    cephalexin (KEFLEX) 250 mg capsule  (Patient not taking: Reported on 7/1/2022)    cetirizine (ZyrTEC) 10 mg tablet Take 10 mg by mouth daily (Patient not taking: Reported on 7/1/2022)    clotrimazole (LOTRIMIN) 1 % cream Apply topically 2 (two) times a day (Patient not taking: Reported on 6/28/2022)    predniSONE 20 mg tablet Take 2 tablets (40 mg total) by mouth daily (Patient not taking: No sig reported)    triamcinolone (KENALOG) 0 5 % cream Apply topically Three times a day (Patient not taking: Reported on 6/28/2022)     No current facility-administered medications on file prior to visit  She has No Known Allergies       Review of Systems   Constitutional: Negative for chills, fatigue and fever  HENT: Negative for congestion, hearing loss, rhinorrhea and sore throat  Eyes: Negative for pain and discharge  Respiratory: Negative for cough, chest tightness and shortness of breath  Cardiovascular: Negative for chest pain and palpitations  Gastrointestinal: Negative for abdominal pain, constipation, diarrhea, nausea and vomiting  Endocrine: Negative for cold intolerance and heat intolerance  Genitourinary: Negative for difficulty urinating and dysuria  Musculoskeletal: Negative for back pain and neck pain  Positive proximal right thigh mass   Skin: Negative for color change and rash  Allergic/Immunologic: Negative for environmental allergies and food allergies  Neurological: Negative for seizures and headaches  Hematological: Negative for adenopathy  Does not bruise/bleed easily  Psychiatric/Behavioral: Negative for confusion and hallucinations  Objective:      /76 (BP Location: Left arm, Patient Position: Sitting, Cuff Size: Adult)   Pulse 71   Temp 98 5 °F (36 9 °C) (Oral)   Ht 5' 4" (1 626 m)   Wt 79 4 kg (175 lb)   SpO2 99%   BMI 30 04 kg/m²     Below is the patient's most recent value for Albumin, ALT, AST, BUN, Calcium, Chloride, Cholesterol, CO2, Creatinine, GFR, Glucose, HDL, Hematocrit, Hemoglobin, Hemoglobin A1C, LDL, Magnesium, Phosphorus, Platelets, Potassium, PSA, Sodium, Triglycerides, and WBC  Lab Results   Component Value Date    HCT 38 2 06/29/2022    HGB 12 5 06/29/2022     06/29/2022    WBC 8 16 06/29/2022     Note: for a comprehensive list of the patient's lab results, access the Results Review activity  Physical Exam  Exam conducted with a chaperone present (NIKIA Nair)  Constitutional:       Appearance: Normal appearance  HENT:      Head: Normocephalic and atraumatic        Nose: Nose normal    Eyes:      General: No scleral icterus  Conjunctiva/sclera: Conjunctivae normal    Cardiovascular:      Rate and Rhythm: Normal rate and regular rhythm  Heart sounds: Normal heart sounds  Pulmonary:      Effort: Pulmonary effort is normal       Breath sounds: Normal breath sounds  Abdominal:      General: There is no distension  Palpations: Abdomen is soft  Tenderness: There is no abdominal tenderness  Musculoskeletal:         General: No signs of injury  Skin:     General: Skin is warm  Coloration: Skin is not jaundiced  Comments: There is a subcutaneous mass in the right proximal medial thigh which is roughly 6 x 5 cm, no signs of infection, this is freely mobile in the subcutaneous tissue   Neurological:      General: No focal deficit present  Mental Status: She is alert and oriented to person, place, and time     Psychiatric:         Mood and Affect: Mood normal          Behavior: Behavior normal

## 2022-07-11 NOTE — PRE-PROCEDURE INSTRUCTIONS
No outpatient medications have been marked as taking for the 7/13/22 encounter [facility] H[facility] Encounter)  Pt does not take any daily medications other than multivitamin which she has received instructions and is holding from surgeon  My Surgical Experience    The following information was developed to assist you to prepare for your operation  What do I need to do before coming to the hospital?   Arrange for a responsible person to drive you to and from the hospital    Arrange care for your children at home  Children are not allowed in the recovery areas of the hospital   Plan to wear clothing that is easy to put on and take off  If you are having shoulder surgery, wear a shirt that buttons or zippers in the front  Bathing  o Shower the evening before and the morning of your surgery with an antibacterial soap  Please refer to the Pre Op Showering Instructions for Surgery Patients Sheet   o Remove nail polish and all body piercing jewelry  o Do not shave any body part for at least 24 hours before surgery-this includes face, arms, legs and upper body  Food  o Nothing to eat or drink after midnight the night before your surgery  This includes candy and chewing gum  o Exception: If your surgery is after 12:00pm (noon), you may have clear liquids such as 7-Up®, ginger ale, apple or cranberry juice, Jell-O®, water, or clear broth until 8:00 am  o Do not drink milk or juice with pulp on the morning before surgery  o Do not drink alcohol 24 hours before surgery  Medicine  o Follow instructions you received from your surgeon about which medicines you may take on the day of surgery  o If instructed to take medicine on the morning of surgery, take pills with just a small sip of water   Call your prescribing doctor for specific infroamtion on what to do if you take insulin    What should I bring to the hospital?    Bring:  Jeni Cagle or a walker, if you have them, for foot or knee surgery   A list of the daily medicines, vitamins, minerals, herbals and nutritional supplements you take  Include the dosages of medicines and the time you take them each day   Glasses, dentures or hearing aids   Minimal clothing; you will be wearing hospital sleepwear   Photo ID; required to verify your identity   If you have a Living Will or Power of , bring a copy of the documents   If you have an ostomy, bring an extra pouch and any supplies you use    Do not bring   Medicines or inhalers   Money, valuables or jewelry    What other information should I know about the day of surgery?  Notify your surgeons if you develop a cold, sore throat, cough, fever, rash or any other illness   Report to the Ambulatory Surgical/Same Day Surgery Unit   You will be instructed to stop at Registration only if you have not been pre-registered   Inform your  fi they do not stay that they will be asked by the staff to leave a phone number where they can be reached   Be available to be reached before surgery  In the event the operating room schedule changes, you may be asked to come in earlier or later than expected    *It is important to tell your doctor and others involved in your health care if you are taking or have been taking any non-prescription drugs, vitamins, minerals, herbals or other nutritional supplements   Any of these may interact with some food or medicines and cause a reaction

## 2022-07-13 ENCOUNTER — HOSPITAL ENCOUNTER (OUTPATIENT)
Facility: HOSPITAL | Age: 32
Setting detail: OUTPATIENT SURGERY
Discharge: HOME | End: 2022-07-13
Attending: STUDENT IN AN ORGANIZED HEALTH CARE EDUCATION/TRAINING PROGRAM | Admitting: STUDENT IN AN ORGANIZED HEALTH CARE EDUCATION/TRAINING PROGRAM
Payer: COMMERCIAL

## 2022-07-13 ENCOUNTER — ANESTHESIA (OUTPATIENT)
Dept: PERIOP | Facility: HOSPITAL | Age: 32
End: 2022-07-13
Payer: COMMERCIAL

## 2022-07-13 ENCOUNTER — ANESTHESIA EVENT (OUTPATIENT)
Dept: PERIOP | Facility: HOSPITAL | Age: 32
End: 2022-07-13
Payer: COMMERCIAL

## 2022-07-13 VITALS
HEIGHT: 64 IN | OXYGEN SATURATION: 98 % | HEART RATE: 75 BPM | TEMPERATURE: 97.2 F | RESPIRATION RATE: 18 BRPM | SYSTOLIC BLOOD PRESSURE: 114 MMHG | BODY MASS INDEX: 30 KG/M2 | WEIGHT: 175.71 LBS | DIASTOLIC BLOOD PRESSURE: 61 MMHG

## 2022-07-13 DIAGNOSIS — D17.23 LIPOMA OF RIGHT LOWER EXTREMITY: Primary | ICD-10-CM

## 2022-07-13 LAB
EXT PREGNANCY TEST URINE: NEGATIVE
EXT. CONTROL: NORMAL

## 2022-07-13 PROCEDURE — 88304 TISSUE EXAM BY PATHOLOGIST: CPT | Performed by: PATHOLOGY

## 2022-07-13 PROCEDURE — 27337 EXC THIGH/KNEE LES SC 3 CM/>: CPT | Performed by: STUDENT IN AN ORGANIZED HEALTH CARE EDUCATION/TRAINING PROGRAM

## 2022-07-13 PROCEDURE — 81025 URINE PREGNANCY TEST: CPT | Performed by: STUDENT IN AN ORGANIZED HEALTH CARE EDUCATION/TRAINING PROGRAM

## 2022-07-13 RX ORDER — TRAMADOL HYDROCHLORIDE 50 MG/1
50 TABLET ORAL EVERY 6 HOURS PRN
Status: DISCONTINUED | OUTPATIENT
Start: 2022-07-13 | End: 2022-07-13 | Stop reason: HOSPADM

## 2022-07-13 RX ORDER — METOCLOPRAMIDE HYDROCHLORIDE 5 MG/ML
5 INJECTION INTRAMUSCULAR; INTRAVENOUS ONCE AS NEEDED
Status: DISCONTINUED | OUTPATIENT
Start: 2022-07-13 | End: 2022-07-13 | Stop reason: HOSPADM

## 2022-07-13 RX ORDER — ONDANSETRON 2 MG/ML
INJECTION INTRAMUSCULAR; INTRAVENOUS AS NEEDED
Status: DISCONTINUED | OUTPATIENT
Start: 2022-07-13 | End: 2022-07-13

## 2022-07-13 RX ORDER — SODIUM CHLORIDE, SODIUM LACTATE, POTASSIUM CHLORIDE, CALCIUM CHLORIDE 600; 310; 30; 20 MG/100ML; MG/100ML; MG/100ML; MG/100ML
75 INJECTION, SOLUTION INTRAVENOUS CONTINUOUS
Status: DISCONTINUED | OUTPATIENT
Start: 2022-07-13 | End: 2022-07-13 | Stop reason: HOSPADM

## 2022-07-13 RX ORDER — HYDROMORPHONE HCL/PF 1 MG/ML
0.5 SYRINGE (ML) INJECTION
Status: DISCONTINUED | OUTPATIENT
Start: 2022-07-13 | End: 2022-07-13 | Stop reason: HOSPADM

## 2022-07-13 RX ORDER — DEXAMETHASONE SODIUM PHOSPHATE 10 MG/ML
INJECTION, SOLUTION INTRAMUSCULAR; INTRAVENOUS AS NEEDED
Status: DISCONTINUED | OUTPATIENT
Start: 2022-07-13 | End: 2022-07-13

## 2022-07-13 RX ORDER — EPHEDRINE SULFATE 50 MG/ML
INJECTION INTRAVENOUS AS NEEDED
Status: DISCONTINUED | OUTPATIENT
Start: 2022-07-13 | End: 2022-07-13

## 2022-07-13 RX ORDER — ONDANSETRON 2 MG/ML
4 INJECTION INTRAMUSCULAR; INTRAVENOUS ONCE AS NEEDED
Status: DISCONTINUED | OUTPATIENT
Start: 2022-07-13 | End: 2022-07-13 | Stop reason: HOSPADM

## 2022-07-13 RX ORDER — MAGNESIUM HYDROXIDE 1200 MG/15ML
LIQUID ORAL AS NEEDED
Status: DISCONTINUED | OUTPATIENT
Start: 2022-07-13 | End: 2022-07-13 | Stop reason: HOSPADM

## 2022-07-13 RX ORDER — MIDAZOLAM HYDROCHLORIDE 2 MG/2ML
INJECTION, SOLUTION INTRAMUSCULAR; INTRAVENOUS AS NEEDED
Status: DISCONTINUED | OUTPATIENT
Start: 2022-07-13 | End: 2022-07-13

## 2022-07-13 RX ORDER — FENTANYL CITRATE/PF 50 MCG/ML
50 SYRINGE (ML) INJECTION
Status: DISCONTINUED | OUTPATIENT
Start: 2022-07-13 | End: 2022-07-13 | Stop reason: HOSPADM

## 2022-07-13 RX ORDER — PROPOFOL 10 MG/ML
INJECTION, EMULSION INTRAVENOUS AS NEEDED
Status: DISCONTINUED | OUTPATIENT
Start: 2022-07-13 | End: 2022-07-13

## 2022-07-13 RX ORDER — TRAMADOL HYDROCHLORIDE 50 MG/1
50 TABLET ORAL EVERY 6 HOURS PRN
Qty: 12 TABLET | Refills: 0 | Status: SHIPPED | OUTPATIENT
Start: 2022-07-13 | End: 2022-07-23

## 2022-07-13 RX ORDER — HEPARIN SODIUM 5000 [USP'U]/ML
5000 INJECTION, SOLUTION INTRAVENOUS; SUBCUTANEOUS ONCE
Status: COMPLETED | OUTPATIENT
Start: 2022-07-13 | End: 2022-07-13

## 2022-07-13 RX ORDER — KETOROLAC TROMETHAMINE 30 MG/ML
INJECTION, SOLUTION INTRAMUSCULAR; INTRAVENOUS AS NEEDED
Status: DISCONTINUED | OUTPATIENT
Start: 2022-07-13 | End: 2022-07-13

## 2022-07-13 RX ORDER — ACETAMINOPHEN 325 MG/1
650 TABLET ORAL EVERY 6 HOURS PRN
Status: DISCONTINUED | OUTPATIENT
Start: 2022-07-13 | End: 2022-07-13 | Stop reason: HOSPADM

## 2022-07-13 RX ORDER — FENTANYL CITRATE 50 UG/ML
INJECTION, SOLUTION INTRAMUSCULAR; INTRAVENOUS AS NEEDED
Status: DISCONTINUED | OUTPATIENT
Start: 2022-07-13 | End: 2022-07-13

## 2022-07-13 RX ORDER — LIDOCAINE HYDROCHLORIDE AND EPINEPHRINE 10; 10 MG/ML; UG/ML
INJECTION, SOLUTION INFILTRATION; PERINEURAL AS NEEDED
Status: DISCONTINUED | OUTPATIENT
Start: 2022-07-13 | End: 2022-07-13 | Stop reason: HOSPADM

## 2022-07-13 RX ORDER — SODIUM CHLORIDE, SODIUM LACTATE, POTASSIUM CHLORIDE, CALCIUM CHLORIDE 600; 310; 30; 20 MG/100ML; MG/100ML; MG/100ML; MG/100ML
125 INJECTION, SOLUTION INTRAVENOUS CONTINUOUS
Status: DISCONTINUED | OUTPATIENT
Start: 2022-07-13 | End: 2022-07-13 | Stop reason: HOSPADM

## 2022-07-13 RX ORDER — CEFAZOLIN SODIUM 1 G/50ML
1000 SOLUTION INTRAVENOUS ONCE
Status: COMPLETED | OUTPATIENT
Start: 2022-07-13 | End: 2022-07-13

## 2022-07-13 RX ORDER — DOCUSATE SODIUM 100 MG/1
100 CAPSULE, LIQUID FILLED ORAL 3 TIMES DAILY PRN
Qty: 30 CAPSULE | Refills: 0 | Status: SHIPPED | OUTPATIENT
Start: 2022-07-13

## 2022-07-13 RX ORDER — LIDOCAINE HYDROCHLORIDE 10 MG/ML
INJECTION, SOLUTION EPIDURAL; INFILTRATION; INTRACAUDAL; PERINEURAL AS NEEDED
Status: DISCONTINUED | OUTPATIENT
Start: 2022-07-13 | End: 2022-07-13

## 2022-07-13 RX ADMIN — FENTANYL CITRATE 25 MCG: 50 INJECTION, SOLUTION INTRAMUSCULAR; INTRAVENOUS at 08:18

## 2022-07-13 RX ADMIN — PROPOFOL 200 MG: 10 INJECTION, EMULSION INTRAVENOUS at 07:56

## 2022-07-13 RX ADMIN — SODIUM CHLORIDE, SODIUM LACTATE, POTASSIUM CHLORIDE, AND CALCIUM CHLORIDE 75 ML/HR: .6; .31; .03; .02 INJECTION, SOLUTION INTRAVENOUS at 07:23

## 2022-07-13 RX ADMIN — HEPARIN SODIUM 5000 UNITS: 5000 INJECTION INTRAVENOUS; SUBCUTANEOUS at 07:23

## 2022-07-13 RX ADMIN — MIDAZOLAM 2 MG: 1 INJECTION INTRAMUSCULAR; INTRAVENOUS at 07:51

## 2022-07-13 RX ADMIN — EPHEDRINE SULFATE 5 MG: 50 INJECTION, SOLUTION INTRAVENOUS at 08:12

## 2022-07-13 RX ADMIN — KETOROLAC TROMETHAMINE 30 MG: 30 INJECTION, SOLUTION INTRAMUSCULAR at 08:25

## 2022-07-13 RX ADMIN — FENTANYL CITRATE 25 MCG: 50 INJECTION, SOLUTION INTRAMUSCULAR; INTRAVENOUS at 08:25

## 2022-07-13 RX ADMIN — FENTANYL CITRATE 50 MCG: 50 INJECTION, SOLUTION INTRAMUSCULAR; INTRAVENOUS at 08:00

## 2022-07-13 RX ADMIN — ONDANSETRON 4 MG: 2 INJECTION INTRAMUSCULAR; INTRAVENOUS at 08:02

## 2022-07-13 RX ADMIN — LIDOCAINE HYDROCHLORIDE 50 MG: 10 INJECTION, SOLUTION EPIDURAL; INFILTRATION; INTRACAUDAL; PERINEURAL at 07:56

## 2022-07-13 RX ADMIN — DEXAMETHASONE SODIUM PHOSPHATE 10 MG: 10 INJECTION, SOLUTION INTRAMUSCULAR; INTRAVENOUS at 08:02

## 2022-07-13 RX ADMIN — CEFAZOLIN SODIUM 1000 MG: 1 SOLUTION INTRAVENOUS at 07:51

## 2022-07-13 RX ADMIN — PROPOFOL 100 MG: 10 INJECTION, EMULSION INTRAVENOUS at 08:02

## 2022-07-13 RX ADMIN — EPHEDRINE SULFATE 10 MG: 50 INJECTION, SOLUTION INTRAVENOUS at 08:21

## 2022-07-13 NOTE — ANESTHESIA PREPROCEDURE EVALUATION
Procedure:  EXCISION BIOPSY TISSUE LESION/MASS UPPER LEG (Right Leg Upper)    Relevant Problems   Other   (+) Lipoma of right lower extremity      No red flag cardiopulm sx  Physical Exam    Airway    Mallampati score: II  TM Distance: >3 FB  Neck ROM: full     Dental   No notable dental hx     Cardiovascular  Cardiovascular exam normal    Pulmonary  Pulmonary exam normal     Other Findings        Anesthesia Plan  ASA Score- 1     Anesthesia Type- general with ASA Monitors  Additional Monitors:   Airway Plan: LMA  Plan Factors-Exercise tolerance (METS): >4 METS  Chart reviewed  EKG reviewed  Imaging results reviewed  Existing labs reviewed  Patient summary reviewed  Patient is not a current smoker  Induction- intravenous  Postoperative Plan-     Informed Consent- Anesthetic plan and risks discussed with patient  I personally reviewed this patient with the CRNA  Discussed and agreed on the Anesthesia Plan with the CRNA  Minh Garcia

## 2022-07-13 NOTE — ANESTHESIA POSTPROCEDURE EVALUATION
Post-Op Assessment Note    CV Status:  Stable  Pain Score: 0    Pain management: adequate     Mental Status:  Sleepy   Hydration Status:  Euvolemic   PONV Controlled:  Controlled   Airway Patency:  Patent      Post Op Vitals Reviewed: Yes      Staff: CRNA   Comments: vss sv nonobstructed uneventful        No complications documented      BP   116/58   Temp 97 3   Pulse 80   Resp 24   SpO2 100

## 2022-07-13 NOTE — INTERVAL H&P NOTE
H&P reviewed  After examining the patient I find no changes in the patients condition since the H&P had been written      Vitals:    07/13/22 0703   BP: 98/57   Pulse: 65   Resp: 18   Temp: (!) 97 4 °F (36 3 °C)   SpO2: 97%

## 2022-10-27 ENCOUNTER — OFFICE VISIT (OUTPATIENT)
Dept: INTERNAL MEDICINE CLINIC | Facility: CLINIC | Age: 32
End: 2022-10-27
Payer: COMMERCIAL

## 2022-10-27 VITALS
SYSTOLIC BLOOD PRESSURE: 102 MMHG | OXYGEN SATURATION: 99 % | HEART RATE: 74 BPM | WEIGHT: 182 LBS | TEMPERATURE: 97.3 F | BODY MASS INDEX: 31.24 KG/M2 | DIASTOLIC BLOOD PRESSURE: 78 MMHG | RESPIRATION RATE: 18 BRPM

## 2022-10-27 DIAGNOSIS — F51.05 INSOMNIA SECONDARY TO DEPRESSION WITH ANXIETY: ICD-10-CM

## 2022-10-27 DIAGNOSIS — F41.8 ANXIETY WITH DEPRESSION: ICD-10-CM

## 2022-10-27 DIAGNOSIS — F41.8 INSOMNIA SECONDARY TO DEPRESSION WITH ANXIETY: ICD-10-CM

## 2022-10-27 PROCEDURE — 99214 OFFICE O/P EST MOD 30 MIN: CPT | Performed by: FAMILY MEDICINE

## 2022-10-27 RX ORDER — FLUOXETINE 10 MG/1
CAPSULE ORAL
Qty: 46 CAPSULE | Refills: 0 | Status: SHIPPED | OUTPATIENT
Start: 2022-10-27 | End: 2022-11-26

## 2022-10-27 RX ORDER — HYDROXYZINE 50 MG/1
50 TABLET, FILM COATED ORAL
Qty: 30 TABLET | Refills: 0 | Status: SHIPPED | OUTPATIENT
Start: 2022-10-27

## 2022-10-27 NOTE — PATIENT INSTRUCTIONS
Everyone can have sad, anxious, and/or overwhelming thoughts at times  If at any time you feel like hurting yourself (or others), please call a crisis center IMMEDIATELY such as [address] [place] [address] at [phone] or go to the nearest Emergency Room
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
## 2022-11-23 DIAGNOSIS — F51.05 INSOMNIA SECONDARY TO DEPRESSION WITH ANXIETY: ICD-10-CM

## 2022-11-23 DIAGNOSIS — F41.8 ANXIETY WITH DEPRESSION: ICD-10-CM

## 2022-11-23 DIAGNOSIS — F41.8 INSOMNIA SECONDARY TO DEPRESSION WITH ANXIETY: ICD-10-CM

## 2022-11-23 RX ORDER — HYDROXYZINE 50 MG/1
50 TABLET, FILM COATED ORAL
Qty: 30 TABLET | Refills: 0 | Status: SHIPPED | OUTPATIENT
Start: 2022-11-23

## 2022-11-23 RX ORDER — FLUOXETINE 10 MG/1
CAPSULE ORAL
Qty: 46 CAPSULE | Refills: 0 | Status: SHIPPED | OUTPATIENT
Start: 2022-11-23

## 2022-12-23 NOTE — DISCHARGE INSTRUCTIONS
Your incisions are covered with Steri-Strips, 4 x 4 gauze, Tegaderm  In 2 days you may remove your dressing, the Steri-Strips will remain on your skin but the 4 x 4 gauze and Tegaderm can be removed  The Steri-Strips will fall off on their own  After your dressings are removed you may shower  Do not soak your incisions including tub baths or swimming  You may shower and let water and soap wash over incisions  Do not scrub your incisions  You may resume your normal diet as tolerated  Do not make any important decisions and do not drive while taking narcotic pain medication  You are prescribed Tramadol for severe pain  Take only as needed  Take Colace to soften your stool while taking narcotic pain medication  You may take Tylenol over-the-counter as needed for pain, follow instructions on the bottle  You may take Ibuprofen over-the-counter as needed for pain, follow instructions on the bottle  You may alternate Tylenol and Ibuprofen if needed, but do not take at the same time  Follow-up with your Surgeon in 2 weeks, call the office for an appointment  You will receive a survey via Email in regards to your same day surgery experience  Please fill out the survey to let us know how we did with your care 
English

## 2022-12-31 DIAGNOSIS — F51.05 INSOMNIA SECONDARY TO DEPRESSION WITH ANXIETY: ICD-10-CM

## 2022-12-31 DIAGNOSIS — F41.8 ANXIETY WITH DEPRESSION: ICD-10-CM

## 2022-12-31 DIAGNOSIS — F41.8 INSOMNIA SECONDARY TO DEPRESSION WITH ANXIETY: ICD-10-CM

## 2022-12-31 RX ORDER — FLUOXETINE 10 MG/1
CAPSULE ORAL
Qty: 46 CAPSULE | Refills: 0 | Status: SHIPPED | OUTPATIENT
Start: 2022-12-31

## 2022-12-31 RX ORDER — HYDROXYZINE 50 MG/1
50 TABLET, FILM COATED ORAL
Qty: 30 TABLET | Refills: 0 | Status: SHIPPED | OUTPATIENT
Start: 2022-12-31

## 2023-02-07 DIAGNOSIS — F51.05 INSOMNIA SECONDARY TO DEPRESSION WITH ANXIETY: ICD-10-CM

## 2023-02-07 DIAGNOSIS — F41.8 INSOMNIA SECONDARY TO DEPRESSION WITH ANXIETY: ICD-10-CM

## 2023-02-07 DIAGNOSIS — F41.8 ANXIETY WITH DEPRESSION: ICD-10-CM

## 2023-02-07 RX ORDER — HYDROXYZINE 50 MG/1
50 TABLET, FILM COATED ORAL
Qty: 30 TABLET | Refills: 0 | Status: SHIPPED | OUTPATIENT
Start: 2023-02-07

## 2023-02-07 RX ORDER — FLUOXETINE 10 MG/1
CAPSULE ORAL
Qty: 46 CAPSULE | Refills: 0 | Status: SHIPPED | OUTPATIENT
Start: 2023-02-07

## 2023-05-25 ENCOUNTER — OFFICE VISIT (OUTPATIENT)
Dept: BARIATRICS | Facility: CLINIC | Age: 33
End: 2023-05-25

## 2023-05-25 VITALS
HEIGHT: 63 IN | RESPIRATION RATE: 16 BRPM | DIASTOLIC BLOOD PRESSURE: 76 MMHG | HEART RATE: 77 BPM | BODY MASS INDEX: 36.32 KG/M2 | WEIGHT: 205 LBS | SYSTOLIC BLOOD PRESSURE: 120 MMHG | TEMPERATURE: 98.6 F

## 2023-05-25 DIAGNOSIS — F41.9 ANXIETY AND DEPRESSION: ICD-10-CM

## 2023-05-25 DIAGNOSIS — Z30.9 CONTRACEPTION MANAGEMENT: ICD-10-CM

## 2023-05-25 DIAGNOSIS — F32.A ANXIETY AND DEPRESSION: ICD-10-CM

## 2023-05-25 DIAGNOSIS — E55.9 VITAMIN D DEFICIENCY: ICD-10-CM

## 2023-05-25 DIAGNOSIS — E66.09 CLASS 2 OBESITY DUE TO EXCESS CALORIES WITHOUT SERIOUS COMORBIDITY WITH BODY MASS INDEX (BMI) OF 36.0 TO 36.9 IN ADULT: Primary | ICD-10-CM

## 2023-05-25 PROBLEM — E66.812 CLASS 2 OBESITY DUE TO EXCESS CALORIES WITHOUT SERIOUS COMORBIDITY WITH BODY MASS INDEX (BMI) OF 36.0 TO 36.9 IN ADULT: Status: ACTIVE | Noted: 2023-05-25

## 2023-05-25 RX ORDER — BUPROPION HYDROCHLORIDE 150 MG/1
150 TABLET, EXTENDED RELEASE ORAL 2 TIMES DAILY
Qty: 60 TABLET | Refills: 1 | Status: SHIPPED | OUTPATIENT
Start: 2023-05-25

## 2023-05-25 RX ORDER — LEVONORGESTREL 52 MG/1
1 INTRAUTERINE DEVICE INTRAUTERINE ONCE
Qty: 1 INTRA UTERINE DEVICE | Refills: 0
Start: 2023-05-25 | End: 2023-05-25

## 2023-05-25 RX ORDER — VALACYCLOVIR HYDROCHLORIDE 1 G/1
1000 TABLET, FILM COATED ORAL DAILY
COMMUNITY
Start: 2023-02-01

## 2023-05-25 NOTE — PROGRESS NOTES
Assessment/Plan:  Lonney Barthel was seen today for consult  Diagnoses and all orders for this visit:    Class 2 obesity due to excess calories without serious comorbidity with body mass index (BMI) of 36 0 to 36 9 in adult  -     Insulin, fasting; Future  -     TSH w/Reflex; Future  -     Vitamin D 25 hydroxy; Future  Barriers to weight loss: mood  Working with counselor , got off Prozac, had panic attacks ,   Advised monitor feelings while starting Wellbutrin, meet our behavioral specialist  Not a candidate for Phentermine  Has IUD  Contraception management  -     Levonorgestrel (Mirena, 52 MG,) 20 MCG/DAY IUD; 1 Intra Uterine Device by Intrauterine route once for 1 dose    Anxiety and depression  -     buPROPion (Wellbutrin SR) 150 mg 12 hr tablet; Take 1 tablet (150 mg total) by mouth 2 (two) times a day  Most common side effects discussed with patient : dizziness, constipation, insomnia, vivid dreams, increased blood pressure, heart rate, depression/anxiety, headache, fatigue, glaucoma  Patient should call/return if he/she develops symptoms of depression/anxiety and stop the medication  Patient advised to call ER for an evaluation if thoughts of harming self/others occur  Suicidal & Crisis Lifeline at  65   Patient denies Hx of seizures, MI, glaucoma , arrhythmia, homicidal /suicidal ideation   Handout provided to the patient today with drug info and side effects profile  Vitamin D deficiency  -     Vitamin D 25 hydroxy; Future         Obesity:   Weight not at goal and patient tried more than 6 months to lose weight and was not able to achieve a meaningful weight loss above 5%  - Discussed options of [facility]estyle [facility]vention Program and Conservative Program and the role of weight loss medications    - Patient is interested in pursuing Conservative Program  - Initial weight loss goal of 5-10% weight loss for improved health  - Weight loss can improve patient's co-morbid conditions and/or prevent weight-related complications  Meet dietician: agrees  Meet behavioral specialist: agrees  • Calorie goal handouts provided:  1200kcal  • Motivational interview performed and patient noted changes to work on until next visit  • Hydration: 64oz fluid, no sugary drinks  • Goal 3 meals per day  • Food log encouraged , phone rafa or paper journal  • Increase physical activity by 10 minutes daily  Denies any hx of glaucoma, seizures, kidney stones, gallstones      Not well controlled anxiety or depression, suicidal behavior or thinking , insomnia or sleep disturbance  Return in 3mo    Subjective:   Chief Complaint   Patient presents with   • Consult     Initial visit with Medical Bariatrician  Sb 3/8--BMI 36 90 Kg/m2       Patient ID: Amairani De La Rosa  is a 29 y o  female with excess weight/obesity here to pursue weight management  Previous notes and records have been reviewed  HPI  Wt Readings from Last 20 Encounters:   05/25/23 93 kg (205 lb)   10/27/22 82 6 kg (182 lb)   07/13/22 79 7 kg (175 lb 11 3 oz)   07/01/22 79 4 kg (175 lb)   06/28/22 77 3 kg (170 lb 6 4 oz)   06/14/22 83 9 kg (185 lb)   12/22/17 83 9 kg (185 lb)   05/03/16 83 1 kg (183 lb 3 2 oz)     Obesity/Excess Weight: Body mass index is 36 9 kg/m²      Severity: severe  Onset: after 3 children , afterwards was depressed and 4 years ago started to gain weight   Modifiers:Phentermine/topamax twice in past year for one year than for another 6 mo, regained al lhte weight back  Contributing factors: Poor Food Choices, Stress/Emotional Eating, Depression and Insufficient time to make appropriate lifestyle changes  Associated symptoms: depression  Cut off juice coffee   B-oatmeal or hardboiled egg sausage or fruit  L-grilled chicken or salad  D-nothing fried grilled or baked red meat , veggies and rice   Snacks:  Hydration:half gallon water  Alcohol: once a mo  Smoking:no  Exercise:walking daily  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 , she is a single mom has 3 children 15, 5, 10 yo cooks for her children  Sleep: terrible, hard to stay asleep  STOP bang:3/8    Past Medical History:   Diagnosis Date   • COVID 08/2021     Past Surgical History:   Procedure Laterality Date   • MASS EXCISION Right 7/13/2022    Procedure: EXCISION OF SOFT TISSUE MASS RIGHT THIGH;  Surgeon: Sin Hutson DO;  Location: [facility];  Service: General   • TUBAL LIGATION       The following portions of the patient's history were reviewed and updated as appropriate: allergies, current medications, past family history, past medical history, past social history, past surgical history, and problem list     ROS:  Review of Systems   Constitutional: Negative for activity change  Fatigue  HENT: Negative for trouble swallowing  Respiratory: Negative for shortness of breath  Cardiovascular: Negative for chest pain, edema  Gastrointestinal: Negative for abdominal pain, nausea and vomiting, acid reflux, constipation/diarrhea  Endocrine: negative for heat /cold intolerance  Genitourinary: Negative for difficulty urinating  Musculoskeletal: Negative for gait problem and myalgias  Psychiatric/Behavioral: + for behavioral problems including anxiety /depression  Objective:  /76 (BP Location: Left arm, Patient Position: Sitting, Cuff Size: Large)   Pulse 77   Temp 98 6 °F (37 °C)   Resp 16   Ht 5' 2 5\" (1 588 m)   Wt 93 kg (205 lb)   BMI 36 90 kg/m²   Constitutional: Well-developed, well-nourished and Obese Body mass index is 36 9 kg/m²  [place] Alert, cooperative  HEENT: No conjunctival injection  No thyroid masses  Pulmonary: No increased work of breathing or signs of respiratory distress  Clear respiratory sounds  CV: Well-perfused, Regular rate and rhythm, no murmurs  Vascular: no peripheral edema  GI: Abdomen obese, Non-distended  Not tender  MSK: no sarcopenia noted   Neuro: Oriented to person, place and time  Normal Speech   Normal " gait   Psych: anxious affect and mood  Normal thought process, no delusions     Labs and Imaging  Recent labs and imaging have been personally reviewed    Lab Results   Component Value Date    HCT 38 2 06/29/2022    HGB 12 5 06/29/2022     (H) 06/29/2022     06/29/2022    WBC 8 16 06/29/2022     Lab Results   Component Value Date    AGAP 6 06/29/2022    BUN 13 06/29/2022    CALCIUM 8 8 06/29/2022     06/29/2022    CO2 27 06/29/2022    CREATININE 0 56 (L) 06/29/2022    EGFR 125 06/29/2022    GLUC 66 06/29/2022    K 4 0 06/29/2022    SODIUM 139 06/29/2022

## 2023-06-23 ENCOUNTER — TELEPHONE (OUTPATIENT)
Dept: ADMINISTRATIVE | Facility: OTHER | Age: 33
End: 2023-06-23

## 2023-06-23 NOTE — TELEPHONE ENCOUNTER
Upon review of the In Basket request we were able to locate, review, and update the patient chart as requested for Pap Smear (HPV) aka Cervical Cancer Screening  Any additional questions or concerns should be emailed to the Practice Liaisons via the appropriate education email address, please do not reply via In Basket      Thank you  Eamon Pedersen MA

## 2023-06-23 NOTE — TELEPHONE ENCOUNTER
----- Message from Rishi Dorman sent at 6/22/2023  1:29 PM EDT -----  Regarding: pap smear  06/22/23 1:30 PM    Hello, our patient Anila Crespo has had Pap Smear (HPV) aka Cervical Cancer Screening completed/performed  Please assist in updating the patient chart by pulling the Care Everywhere (CE) document  The date of service is 2023       Thank you,  Rishi GARCIA [facility]

## 2023-07-03 ENCOUNTER — TELEPHONE (OUTPATIENT)
Dept: BARIATRICS | Facility: CLINIC | Age: 33
End: 2023-07-03

## 2024-01-19 ENCOUNTER — OFFICE VISIT (OUTPATIENT)
Age: 34
End: 2024-01-19
Payer: COMMERCIAL

## 2024-01-19 VITALS
OXYGEN SATURATION: 99 % | RESPIRATION RATE: 18 BRPM | SYSTOLIC BLOOD PRESSURE: 110 MMHG | BODY MASS INDEX: 37.74 KG/M2 | HEART RATE: 80 BPM | TEMPERATURE: 97.9 F | DIASTOLIC BLOOD PRESSURE: 82 MMHG | HEIGHT: 63 IN | WEIGHT: 213 LBS

## 2024-01-19 DIAGNOSIS — Z13.220 SCREENING FOR LIPID DISORDERS: ICD-10-CM

## 2024-01-19 DIAGNOSIS — Z00.00 ANNUAL PHYSICAL EXAM: Primary | ICD-10-CM

## 2024-01-19 DIAGNOSIS — Z13.29 SCREENING FOR THYROID DISORDER: ICD-10-CM

## 2024-01-19 DIAGNOSIS — Z11.4 SCREENING FOR HIV (HUMAN IMMUNODEFICIENCY VIRUS): ICD-10-CM

## 2024-01-19 DIAGNOSIS — F41.9 ANXIETY AND DEPRESSION: ICD-10-CM

## 2024-01-19 DIAGNOSIS — Z11.59 NEED FOR HEPATITIS C SCREENING TEST: ICD-10-CM

## 2024-01-19 DIAGNOSIS — F32.A ANXIETY AND DEPRESSION: ICD-10-CM

## 2024-01-19 PROCEDURE — 99395 PREV VISIT EST AGE 18-39: CPT

## 2024-01-19 RX ORDER — BUSPIRONE HYDROCHLORIDE 7.5 MG/1
7.5 TABLET ORAL 2 TIMES DAILY
Qty: 60 TABLET | Refills: 1 | Status: SHIPPED | OUTPATIENT
Start: 2024-01-19

## 2024-01-19 RX ORDER — FLUOXETINE 10 MG/1
CAPSULE ORAL
Qty: 60 CAPSULE | Refills: 1 | Status: SHIPPED | OUTPATIENT
Start: 2024-01-19
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
## 2024-01-19 NOTE — ASSESSMENT & PLAN NOTE
Pt states is in therapy, and does it weekly and it helps but trying to switch to in-person vs virtual therapy. States feels better with therapy.

## 2024-02-27 ENCOUNTER — TELEPHONE (OUTPATIENT)
Age: 34
End: 2024-02-27

## 2024-03-14 DIAGNOSIS — F41.9 ANXIETY AND DEPRESSION: ICD-10-CM

## 2024-03-14 DIAGNOSIS — F32.A ANXIETY AND DEPRESSION: ICD-10-CM

## 2024-03-15 RX ORDER — BUSPIRONE HYDROCHLORIDE 7.5 MG/1
7.5 TABLET ORAL 2 TIMES DAILY
Qty: 60 TABLET | Refills: 1 | Status: SHIPPED | OUTPATIENT
Start: 2024-03-15

## 2024-03-15 RX ORDER — FLUOXETINE 10 MG/1
CAPSULE ORAL
Qty: 60 CAPSULE | Refills: 1 | Status: SHIPPED | OUTPATIENT
Start: 2024-03-15

## 2024-05-04 ENCOUNTER — HOSPITAL ENCOUNTER (EMERGENCY)
Facility: HOSPITAL | Age: 34
Discharge: HOME | End: 2024-05-04
Payer: COMMERCIAL

## 2024-05-04 VITALS
TEMPERATURE: 97.7 F | WEIGHT: 200 LBS | HEIGHT: 63 IN | OXYGEN SATURATION: 97 % | HEART RATE: 75 BPM | DIASTOLIC BLOOD PRESSURE: 69 MMHG | BODY MASS INDEX: 35.44 KG/M2 | SYSTOLIC BLOOD PRESSURE: 114 MMHG | RESPIRATION RATE: 20 BRPM

## 2024-05-04 DIAGNOSIS — K08.89 DENTALGIA: Primary | ICD-10-CM

## 2024-05-04 PROCEDURE — 96372 THER/PROPH/DIAG INJ SC/IM: CPT

## 2024-05-04 PROCEDURE — 99282 EMERGENCY DEPT VISIT SF MDM: CPT

## 2024-05-04 PROCEDURE — 99284 EMERGENCY DEPT VISIT MOD MDM: CPT | Performed by: PHYSICIAN ASSISTANT

## 2024-05-04 RX ORDER — AMOXICILLIN AND CLAVULANATE POTASSIUM 875; 125 MG/1; MG/1
1 TABLET, FILM COATED ORAL ONCE
Status: COMPLETED | OUTPATIENT
Start: 2024-05-04 | End: 2024-05-04

## 2024-05-04 RX ORDER — KETOROLAC TROMETHAMINE 30 MG/ML
30 INJECTION, SOLUTION INTRAMUSCULAR; INTRAVENOUS ONCE
Status: COMPLETED | OUTPATIENT
Start: 2024-05-04 | End: 2024-05-04

## 2024-05-04 RX ORDER — OXYCODONE HYDROCHLORIDE 5 MG/1
5 TABLET ORAL EVERY 6 HOURS PRN
Qty: 12 TABLET | Refills: 0 | Status: SHIPPED | OUTPATIENT
Start: 2024-05-04 | End: 2024-05-07

## 2024-05-04 RX ORDER — AMOXICILLIN AND CLAVULANATE POTASSIUM 875; 125 MG/1; MG/1
1 TABLET, FILM COATED ORAL EVERY 12 HOURS
Qty: 13 TABLET | Refills: 0 | Status: SHIPPED | OUTPATIENT
Start: 2024-05-04 | End: 2024-05-11

## 2024-05-04 RX ADMIN — BENZOCAINE 1 APPLICATION: 220 GEL, DENTIFRICE DENTAL at 07:02

## 2024-05-04 RX ADMIN — KETOROLAC TROMETHAMINE 30 MG: 30 INJECTION, SOLUTION INTRAMUSCULAR; INTRAVENOUS at 07:01

## 2024-05-04 RX ADMIN — AMOXICILLIN AND CLAVULANATE POTASSIUM 1 TABLET: 875; 125 TABLET, FILM COATED ORAL at 07:01

## 2024-05-04 NOTE — ED PROVIDER NOTES
History  Chief Complaint   Patient presents with    Dental Pain     Pt states she broke her tooth two days ago and is now having pain to the area. Not able to see the dentist until Tuesday    Sinus Problem     Also states congestion and a runny nose      32yo female with no significant past medical history presenting for evaluation of dental pain. She states her left lower molar cracked 2 days ago. She states she has a nerve exposed which is causing her to have severe pain. The pain radiates to the left ear and is keeping her up at night. She has been taking Tylenol and ibuprofen every 4-6 hours without relief. She also states she can taste that she has an infection. No fevers. She has a dentist appointment scheduled in 3 days.        History provided by:  Patient   used: No    Dental Pain  Associated symptoms: congestion    Associated symptoms: no facial swelling and no fever    Sinus Problem  Associated symptoms: congestion and tooth pain    Associated symptoms: no chills and no fever        Prior to Admission Medications   Prescriptions Last Dose Informant Patient Reported? Taking?   FLUoxetine (PROzac) 10 mg capsule   No No   Sig: TAKE 1 CAPSULE BY MOUTH EVERY DAY FOR 14 DAYS THEN INCREASE TO 2 CAPSULES DAILY THEREAFTER   Levonorgestrel (Mirena, 52 MG,) 20 MCG/DAY IUD   No No   Si Intra Uterine Device by Intrauterine route once for 1 dose   Multiple Vitamins-Minerals (multivitamin with minerals) tablet  Self Yes No   Sig: Take 1 tablet by mouth daily   Patient not taking: Reported on 2024   busPIRone (BUSPAR) 7.5 mg tablet   No No   Sig: TAKE 1 TABLET BY MOUTH 2 TIMES A DAY.   valACYclovir (VALTREX) 1,000 mg tablet   Yes No   Sig: Take 1,000 mg by mouth daily   Patient not taking: Reported on 2024      Facility-Administered Medications: None       Past Medical History:   Diagnosis Date    COVID 2021       Past Surgical History:   Procedure Laterality Date    MASS EXCISION  Right 7/13/2022    Procedure: EXCISION OF SOFT TISSUE MASS RIGHT THIGH;  Surgeon: Ranjith William DO;  Location: [facility];  Service: General    TUBAL LIGATION         Family History   Problem Relation Age of Onset    Thyroid disease Mother     Hypertension Mother     No Known Problems Father     Thyroid disease Brother     Asthma Son      I have reviewed and agree with the history as documented.    E-Cigarette/Vaping    E-Cigarette Use Never User      E-Cigarette/Vaping Substances    Nicotine No     THC No     CBD No     Flavoring No     Other No     Unknown No      Social History     Tobacco Use    Smoking status: Never    Smokeless tobacco: Never   Vaping Use    Vaping status: Never Used   Substance Use Topics    Alcohol use: Yes     Comment: occassional    Drug use: No       Review of Systems   Constitutional:  Negative for chills and fever.   HENT:  Positive for congestion and dental problem. Negative for facial swelling.    Eyes:  Negative for discharge and redness.   Skin:  Negative for color change and wound.   Psychiatric/Behavioral:  Negative for confusion. The patient is not nervous/anxious.    All other systems reviewed and are negative.      Physical Exam  Physical Exam  Vitals and nursing note reviewed.   Constitutional:       General: She is not in acute distress.     Appearance: Normal appearance. She is not toxic-appearing.   HENT:      Head: Normocephalic and atraumatic.      Right Ear: Tympanic membrane, ear canal and external ear normal.      Left Ear: Tympanic membrane, ear canal and external ear normal.      Mouth/Throat:      Mouth: Mucous membranes are moist.      Pharynx: Oropharynx is clear. No oropharyngeal exudate or posterior oropharyngeal erythema.     Eyes:      General: No scleral icterus.        Right eye: No discharge.         Left eye: No discharge.      Conjunctiva/sclera: Conjunctivae normal.   Cardiovascular:      Rate and Rhythm: Normal rate.   Pulmonary:      Effort:  Pulmonary effort is normal. No respiratory distress.      Breath sounds: No stridor.   Musculoskeletal:         General: No deformity. Normal range of motion.      Cervical back: Normal range of motion and neck supple.   Skin:     General: Skin is warm and dry.   Neurological:      General: No focal deficit present.      Mental Status: She is alert. Mental status is at baseline.      GCS: GCS eye subscore is 4. GCS verbal subscore is 5. GCS motor subscore is 6.   Psychiatric:         Mood and Affect: Mood normal.         Behavior: Behavior normal.         Vital Signs  ED Triage Vitals   Temperature Pulse Respirations Blood Pressure SpO2   05/04/24 0620 05/04/24 0620 05/04/24 0620 05/04/24 0620 05/04/24 0620   97.7 °F (36.5 °C) 75 20 114/69 97 %      Temp Source Heart Rate Source Patient Position - Orthostatic VS BP Location FiO2 (%)   05/04/24 0620 05/04/24 0620 05/04/24 0620 05/04/24 0620 --   Temporal Monitor Sitting Left arm       Pain Score       05/04/24 0701       8           Vitals:    05/04/24 0620   BP: 114/69   Pulse: 75   Patient Position - Orthostatic VS: Sitting         Visual Acuity      ED Medications  Medications   ketorolac (TORADOL) injection 30 mg (30 mg Intramuscular Given 5/4/24 0701)   BENZOCAINE (DENTAL) 20 % swab 1 Application (1 Application Oral Given 5/4/24 0702)   amoxicillin-clavulanate (AUGMENTIN) 875-125 mg per tablet 1 tablet (1 tablet Oral Given 5/4/24 0701)   BENZOCAINE (DENTAL) 20 % swab 1 Application (1 Application Oral Given 5/4/24 0702)       Diagnostic Studies  Results Reviewed       None                   No orders to display              Procedures  Procedures         ED Course                   SBIRT 22yo+      Flowsheet Row Most Recent Value   Initial Alcohol Screen: US AUDIT-C     1. How often do you have a drink containing alcohol? 1 Filed at: 05/04/2024 0621   2. How many drinks containing alcohol do you have on a typical day you are drinking?  0 Filed at: 05/04/2024 0621    3b. FEMALE Any Age, or MALE 65+: How often do you have 4 or more drinks on one occassion? 0 Filed at: 05/04/2024 0621   Audit-C Score 1 Filed at: 05/04/2024 0621   LOUIE: How many times in the past year have you...    Used an illegal drug or used a prescription medication for non-medical reasons? Never Filed at: 05/04/2024 0621                      Medical Decision Making  33yoF here with L lower dental pain x 2 days after she cracked her tooth. Unrelieved with Tylenol/ibuprofen. No fevers. She is well appearing with stable vitals. No facial swelling or signs of Juvencio's on exam. She was given IM Toradol and topical benzocaine for pain relief. She was started on a course of Augmentin. Script for oxycodone given for severe breakthrough pain. She has an appt with her dentist in 3 days. ED return precautions discussed. Patient expressed understanding and is agreeable to plan. Patient discharged in stable condition.        Problems Addressed:  Dentalgia: acute illness or injury    Risk  OTC drugs.  Prescription drug management.             Disposition  Final diagnoses:   Dentalgia     Time reflects when diagnosis was documented in both MDM as applicable and the Disposition within this note       Time User Action Codes Description Comment    5/4/2024  6:55 AM Vanessa Martin Add [K08.89] Dentalgia           ED Disposition       ED Disposition   Discharge    Condition   Stable    Date/Time   Sat May 4, 2024 0655    Comment   Shannen Pacheco discharge to home/self care.                   Follow-up Information       Follow up With Specialties Details Why Contact Info Additional Information    [facility] [place]  Schedule an appointment as soon as possible for a visit   [address] [address] [phone]     [facility] Emergency Medicine  If symptoms worsen [address] [facility]  [id number]  [phone] [facility] Emergency Department, [address], [place], [place], [address]            Discharge Medication List as of 5/4/2024  6:59 AM        START taking these medications    Details   amoxicillin-clavulanate (AUGMENTIN) 875-125 mg per tablet Take 1 tablet by mouth every 12 (twelve) hours for 7 days, Starting Sat 5/4/2024, Until Sat 5/11/2024, Normal      oxyCODONE (ROXICODONE) 5 immediate release tablet Take 1 tablet (5 mg total) by mouth every 6 (six) hours as needed for severe pain for up to 3 days Max Daily Amount: 20 mg, Starting Sat 5/4/2024, Until Tue 5/7/2024 at 2359, Normal           CONTINUE these medications which have NOT CHANGED    Details   busPIRone (BUSPAR) 7.5 mg tablet TAKE 1 TABLET BY MOUTH 2 TIMES A DAY., Starting Fri 3/15/2024, Normal      FLUoxetine (PROzac) 10 mg capsule TAKE 1 CAPSULE BY MOUTH EVERY DAY FOR 14 DAYS THEN INCREASE TO 2 CAPSULES DAILY THEREAFTER, Normal      Levonorgestrel (Mirena, 52 MG,) 20 MCG/DAY IUD 1 Intra Uterine Device by Intrauterine route once for 1 dose, Starting Thu 5/25/2023, No Print      Multiple Vitamins-Minerals (multivitamin with minerals) tablet Take 1 tablet by mouth daily, Historical Med      valACYclovir (VALTREX) 1,000 mg tablet Take 1,000 mg by mouth daily, Starting Wed 2/1/2023, Historical Med             No discharge procedures on file.    PDMP Review         Value Time User    PDMP Reviewed  Yes 5/4/2024  6:56 AM Vanessa Martin PA-C            ED Provider  Electronically Signed by             Vanessa Martin PA-C  05/04/24 0718

## 2024-05-04 NOTE — Clinical Note
Shannen Pacheco was seen and treated in our emergency department on 5/4/2024.    No restrictions            Diagnosis:     Shannen  may return to work on return date.    She may return on this date: 05/06/2024         If you have any questions or concerns, please don't hesitate to call.      Vanessa Martin PA-C    ______________________________           _______________          _______________  Hospital Representative                              Date                                Time

## 2024-05-04 NOTE — DISCHARGE INSTRUCTIONS
Take antibiotics as prescribed. Take Tylenol 650mg and ibuprofen 600mg every 6 hours as needed. Take oxycodone only as needed for severe breakthrough pain.    Please follow-up with your dentist. Return to the ER with any worsening symptoms or fevers.

## 2024-09-17 ENCOUNTER — TELEPHONE (OUTPATIENT)
Age: 34
End: 2024-09-17

## 2024-09-17 ENCOUNTER — OFFICE VISIT (OUTPATIENT)
Age: 34
End: 2024-09-17
Payer: COMMERCIAL

## 2024-09-17 ENCOUNTER — APPOINTMENT (OUTPATIENT)
Age: 34
End: 2024-09-17
Payer: COMMERCIAL

## 2024-09-17 VITALS
SYSTOLIC BLOOD PRESSURE: 116 MMHG | BODY MASS INDEX: 36.07 KG/M2 | HEART RATE: 63 BPM | DIASTOLIC BLOOD PRESSURE: 76 MMHG | TEMPERATURE: 97.4 F | HEIGHT: 63 IN | WEIGHT: 203.6 LBS | OXYGEN SATURATION: 98 %

## 2024-09-17 DIAGNOSIS — Z13.29 SCREENING FOR THYROID DISORDER: ICD-10-CM

## 2024-09-17 DIAGNOSIS — R25.2 CRAMPING OF HANDS: ICD-10-CM

## 2024-09-17 DIAGNOSIS — F41.9 ANXIETY AND DEPRESSION: Primary | ICD-10-CM

## 2024-09-17 DIAGNOSIS — G56.03 BILATERAL CARPAL TUNNEL SYNDROME: ICD-10-CM

## 2024-09-17 DIAGNOSIS — K64.9 HEMORRHOIDS, UNSPECIFIED HEMORRHOID TYPE: ICD-10-CM

## 2024-09-17 DIAGNOSIS — Z11.4 SCREENING FOR HIV (HUMAN IMMUNODEFICIENCY VIRUS): ICD-10-CM

## 2024-09-17 DIAGNOSIS — Z11.59 NEED FOR HEPATITIS C SCREENING TEST: ICD-10-CM

## 2024-09-17 DIAGNOSIS — F41.9 ANXIETY AND DEPRESSION: ICD-10-CM

## 2024-09-17 DIAGNOSIS — F32.A ANXIETY AND DEPRESSION: ICD-10-CM

## 2024-09-17 DIAGNOSIS — R21 RASH: ICD-10-CM

## 2024-09-17 DIAGNOSIS — F32.A ANXIETY AND DEPRESSION: Primary | ICD-10-CM

## 2024-09-17 DIAGNOSIS — Z13.220 SCREENING FOR LIPID DISORDERS: ICD-10-CM

## 2024-09-17 LAB
ALBUMIN SERPL BCG-MCNC: 4.3 G/DL (ref 3.5–5)
ALP SERPL-CCNC: 78 U/L (ref 34–104)
ALT SERPL W P-5'-P-CCNC: 27 U/L (ref 7–52)
ANION GAP SERPL CALCULATED.3IONS-SCNC: 8 MMOL/L (ref 4–13)
AST SERPL W P-5'-P-CCNC: 21 U/L (ref 13–39)
BASOPHILS # BLD AUTO: 0.05 THOUSANDS/ΜL (ref 0–0.1)
BASOPHILS NFR BLD AUTO: 1 % (ref 0–1)
BILIRUB SERPL-MCNC: 0.78 MG/DL (ref 0.2–1)
BUN SERPL-MCNC: 14 MG/DL (ref 5–25)
CALCIUM SERPL-MCNC: 8.9 MG/DL (ref 8.4–10.2)
CHLORIDE SERPL-SCNC: 106 MMOL/L (ref 96–108)
CHOLEST SERPL-MCNC: 143 MG/DL
CO2 SERPL-SCNC: 24 MMOL/L (ref 21–32)
CREAT SERPL-MCNC: 0.49 MG/DL (ref 0.6–1.3)
EOSINOPHIL # BLD AUTO: 0.1 THOUSAND/ΜL (ref 0–0.61)
EOSINOPHIL NFR BLD AUTO: 2 % (ref 0–6)
ERYTHROCYTE [DISTWIDTH] IN BLOOD BY AUTOMATED COUNT: 11.8 % (ref 11.6–15.1)
GFR SERPL CREATININE-BSD FRML MDRD: 129 ML/MIN/1.73SQ M
GLUCOSE P FAST SERPL-MCNC: 83 MG/DL (ref 65–99)
HCT VFR BLD AUTO: 37.8 % (ref 34.8–46.1)
HCV AB SER QL: NORMAL
HDLC SERPL-MCNC: 44 MG/DL
HGB BLD-MCNC: 12.8 G/DL (ref 11.5–15.4)
HIV 1+2 AB+HIV1 P24 AG SERPL QL IA: NORMAL
HIV 2 AB SERPL QL IA: NORMAL
HIV1 AB SERPL QL IA: NORMAL
HIV1 P24 AG SERPL QL IA: NORMAL
IMM GRANULOCYTES # BLD AUTO: 0.02 THOUSAND/UL (ref 0–0.2)
IMM GRANULOCYTES NFR BLD AUTO: 0 % (ref 0–2)
LDLC SERPL CALC-MCNC: 83 MG/DL (ref 0–100)
LYMPHOCYTES # BLD AUTO: 1.66 THOUSANDS/ΜL (ref 0.6–4.47)
LYMPHOCYTES NFR BLD AUTO: 28 % (ref 14–44)
MCH RBC QN AUTO: 32.7 PG (ref 26.8–34.3)
MCHC RBC AUTO-ENTMCNC: 33.9 G/DL (ref 31.4–37.4)
MCV RBC AUTO: 96 FL (ref 82–98)
MONOCYTES # BLD AUTO: 0.5 THOUSAND/ΜL (ref 0.17–1.22)
MONOCYTES NFR BLD AUTO: 8 % (ref 4–12)
NEUTROPHILS # BLD AUTO: 3.71 THOUSANDS/ΜL (ref 1.85–7.62)
NEUTS SEG NFR BLD AUTO: 61 % (ref 43–75)
NONHDLC SERPL-MCNC: 99 MG/DL
NRBC BLD AUTO-RTO: 0 /100 WBCS
PLATELET # BLD AUTO: 431 THOUSANDS/UL (ref 149–390)
PMV BLD AUTO: 8.9 FL (ref 8.9–12.7)
POTASSIUM SERPL-SCNC: 3.8 MMOL/L (ref 3.5–5.3)
PROT SERPL-MCNC: 7.1 G/DL (ref 6.4–8.4)
RBC # BLD AUTO: 3.92 MILLION/UL (ref 3.81–5.12)
SODIUM SERPL-SCNC: 138 MMOL/L (ref 135–147)
TRIGL SERPL-MCNC: 81 MG/DL
TSH SERPL DL<=0.05 MIU/L-ACNC: 1.86 UIU/ML (ref 0.45–4.5)
WBC # BLD AUTO: 6.04 THOUSAND/UL (ref 4.31–10.16)

## 2024-09-17 PROCEDURE — 86803 HEPATITIS C AB TEST: CPT

## 2024-09-17 PROCEDURE — 99214 OFFICE O/P EST MOD 30 MIN: CPT

## 2024-09-17 PROCEDURE — 85025 COMPLETE CBC W/AUTO DIFF WBC: CPT

## 2024-09-17 PROCEDURE — 80053 COMPREHEN METABOLIC PANEL: CPT

## 2024-09-17 PROCEDURE — 80061 LIPID PANEL: CPT

## 2024-09-17 PROCEDURE — 36415 COLL VENOUS BLD VENIPUNCTURE: CPT

## 2024-09-17 PROCEDURE — 87389 HIV-1 AG W/HIV-1&-2 AB AG IA: CPT

## 2024-09-17 PROCEDURE — 84443 ASSAY THYROID STIM HORMONE: CPT

## 2024-09-17 RX ORDER — ESCITALOPRAM OXALATE 5 MG/1
5 TABLET ORAL DAILY
Qty: 30 TABLET | Refills: 1 | Status: SHIPPED | OUTPATIENT
Start: 2024-09-17

## 2024-09-17 RX ORDER — FLUOXETINE 10 MG/1
CAPSULE ORAL
Qty: 60 CAPSULE | Refills: 1 | Status: CANCELLED | OUTPATIENT
Start: 2024-09-17

## 2024-09-17 RX ORDER — BUSPIRONE HYDROCHLORIDE 7.5 MG/1
7.5 TABLET ORAL 2 TIMES DAILY
Qty: 60 TABLET | Refills: 1 | Status: CANCELLED | OUTPATIENT
Start: 2024-09-17

## 2024-09-17 NOTE — TELEPHONE ENCOUNTER
Received [facility] from [facility], pt can not be seen due to [place].     Contacted patient in regards to Routine Referral in attempts to verify patient's needs of services and add patient to proper wait list. LVM for patient to contact intake dept  in regards to referral for services.     1st attempt

## 2024-09-17 NOTE — TELEPHONE ENCOUNTER
Patient states she was to receive a referral for a hemorrhoid and she didn't get a paper, please advise

## 2024-09-17 NOTE — ASSESSMENT & PLAN NOTE
PHQ-9 score of 16 and TORO-7 score of 14.  Discussed that we can restart Prozac and buspirone, but that we should monitor closely for any signs/symptoms of serotonin syndrome with the 2 medications together.  I also discussed another option is to start Lexapro, which is approved for treatment of both anxiety and depression.  The patient would like to try Lexapro.  I also recommend she follow-up with a therapist.  Referral placed.  Recommend follow-up in the office in 1 month for reevaluation.    Orders:    escitalopram (LEXAPRO) 5 mg tablet; Take 1 tablet (5 mg total) by mouth daily    Ambulatory referral to Psych Services; Future

## 2024-09-18 ENCOUNTER — TELEPHONE (OUTPATIENT)
Age: 34
End: 2024-09-18

## 2024-09-18 NOTE — TELEPHONE ENCOUNTER
----- Message from Stephanie Quintanilla PA-C sent at 9/18/2024  8:06 AM EDT -----  Lab work unremarkable.  No urgent findings.

## 2024-12-11 ENCOUNTER — HOSPITAL ENCOUNTER (EMERGENCY)
Facility: HOSPITAL | Age: 34
Discharge: HOME | End: 2024-12-11
Attending: EMERGENCY MEDICINE
Payer: COMMERCIAL

## 2024-12-11 VITALS
DIASTOLIC BLOOD PRESSURE: 100 MMHG | HEART RATE: 89 BPM | RESPIRATION RATE: 18 BRPM | BODY MASS INDEX: 33.46 KG/M2 | TEMPERATURE: 98 F | HEIGHT: 64 IN | OXYGEN SATURATION: 100 % | WEIGHT: 196 LBS | SYSTOLIC BLOOD PRESSURE: 137 MMHG

## 2024-12-11 DIAGNOSIS — K04.7 DENTAL ABSCESS: ICD-10-CM

## 2024-12-11 DIAGNOSIS — L50.9 URTICARIA: Primary | ICD-10-CM

## 2024-12-11 PROCEDURE — 96365 THER/PROPH/DIAG IV INF INIT: CPT

## 2024-12-11 PROCEDURE — 96375 TX/PRO/DX INJ NEW DRUG ADDON: CPT

## 2024-12-11 PROCEDURE — 99282 EMERGENCY DEPT VISIT SF MDM: CPT

## 2024-12-11 PROCEDURE — 99284 EMERGENCY DEPT VISIT MOD MDM: CPT | Performed by: EMERGENCY MEDICINE

## 2024-12-11 RX ORDER — METHYLPREDNISOLONE SODIUM SUCCINATE 125 MG/2ML
125 INJECTION, POWDER, LYOPHILIZED, FOR SOLUTION INTRAMUSCULAR; INTRAVENOUS ONCE
Status: COMPLETED | OUTPATIENT
Start: 2024-12-11 | End: 2024-12-11

## 2024-12-11 RX ORDER — DIPHENHYDRAMINE HYDROCHLORIDE 50 MG/ML
50 INJECTION INTRAMUSCULAR; INTRAVENOUS ONCE
Status: COMPLETED | OUTPATIENT
Start: 2024-12-11 | End: 2024-12-11

## 2024-12-11 RX ORDER — CLINDAMYCIN HYDROCHLORIDE 300 MG/1
300 CAPSULE ORAL 4 TIMES DAILY
Qty: 28 CAPSULE | Refills: 0 | Status: SHIPPED | OUTPATIENT
Start: 2024-12-11 | End: 2024-12-18

## 2024-12-11 RX ORDER — DIPHENHYDRAMINE HCL 25 MG
50 TABLET ORAL EVERY 6 HOURS PRN
Qty: 20 TABLET | Refills: 0 | Status: SHIPPED | OUTPATIENT
Start: 2024-12-11 | End: 2024-12-16

## 2024-12-11 RX ORDER — PREDNISONE 50 MG/1
50 TABLET ORAL DAILY
Qty: 5 TABLET | Refills: 0 | Status: SHIPPED | OUTPATIENT
Start: 2024-12-11

## 2024-12-11 RX ADMIN — METHYLPREDNISOLONE SODIUM SUCCINATE 125 MG: 125 INJECTION, POWDER, FOR SOLUTION INTRAMUSCULAR; INTRAVENOUS at 20:24

## 2024-12-11 RX ADMIN — CEFTRIAXONE SODIUM 1000 MG: 10 INJECTION, POWDER, FOR SOLUTION INTRAVENOUS at 20:26

## 2024-12-11 RX ADMIN — DIPHENHYDRAMINE HYDROCHLORIDE 50 MG: 50 INJECTION, SOLUTION INTRAMUSCULAR; INTRAVENOUS at 20:20

## 2024-12-12 NOTE — ED PROVIDER NOTES
Time reflects when diagnosis was documented in both MDM as applicable and the Disposition within this note       Time User Action Codes Description Comment    12/11/2024  9:04 PM Yobani Joshi Add [L50.9] Urticaria     12/11/2024  9:04 PM Yobani Joshi Add [K04.7] Dental abscess           ED Disposition       ED Disposition   Discharge    Condition   Stable    Date/Time   Wed Dec 11, 2024  9:04 PM    Comment   Shannen Pacheco discharge to home/self care.                   Assessment & Plan       Medical Decision Making  Patient treated with IV meds and symptoms improved after period of observation.    Problems Addressed:  Dental abscess: acute illness or injury  Urticaria: acute illness or injury    Risk  OTC drugs.  Prescription drug management.             Medications   ceftriaxone (ROCEPHIN) 1 g/50 mL in dextrose IVPB (1,000 mg Intravenous New Bag 12/11/24 2026)   diphenhydrAMINE (BENADRYL) injection 50 mg (50 mg Intravenous Given 12/11/24 2020)   methylPREDNISolone sodium succinate (Solu-MEDROL) injection 125 mg (125 mg Intravenous Given 12/11/24 2024)       ED Risk Strat Scores                          SBIRT 22yo+      Flowsheet Row Most Recent Value   Initial Alcohol Screen: US AUDIT-C     1. How often do you have a drink containing alcohol? 0 Filed at: 12/11/2024 1959   2. How many drinks containing alcohol do you have on a typical day you are drinking?  0 Filed at: 12/11/2024 1959   3b. FEMALE Any Age, or MALE 65+: How often do you have 4 or more drinks on one occassion? 0 Filed at: 12/11/2024 1959   Audit-C Score 0 Filed at: 12/11/2024 1959   LOUIE: How many times in the past year have you...    Used an illegal drug or used a prescription medication for non-medical reasons? Never Filed at: 12/11/2024 1959                            History of Present Illness       Chief Complaint   Patient presents with    Itching     C/o generalized hives & itching since yesterday. Patient unsure what caused the hives.  Patient has taken several doses of benadryl with little relief        Past Medical History:   Diagnosis Date    COVID 08/2021      Past Surgical History:   Procedure Laterality Date    MASS EXCISION Right 7/13/2022    Procedure: EXCISION OF SOFT TISSUE MASS RIGHT THIGH;  Surgeon: Ranjith William DO;  Location: [facility];  Service: General    TUBAL LIGATION        Family History   Problem Relation Age of Onset    Thyroid disease Mother     Hypertension Mother     No Known Problems Father     Thyroid disease Brother     Asthma Son       Social History     Tobacco Use    Smoking status: Never    Smokeless tobacco: Never   Vaping Use    Vaping status: Never Used   Substance Use Topics    Alcohol use: Yes     Comment: occassional    Drug use: No      E-Cigarette/Vaping    E-Cigarette Use Never User       E-Cigarette/Vaping Substances    Nicotine No     THC No     CBD No     Flavoring No     Other No     Unknown No       I have reviewed and agree with the history as documented.     Shannen Pacheco is a 34 y.o.  year old female  Past Medical History:  08/2021: COVID  Social History    Tobacco Use      Smoking status: Never      Smokeless tobacco: Never    Vaping Use      Vaping status: Never Used    Alcohol use: Yes      Comment: occassional    Drug use: No    Patient presents with:  Itching: C/o generalized hives & itching since yesterday. Patient unsure what caused the hives. Patient has taken several doses of benadryl with little relief   Patient also has a bad dental infection.  Premolar with mandibular swelling no fever no chills.    The rash is located to bilateral upper extremities.  It is itchy.  It is palpable.  He does not look like urticaria per se looks like some kind of exanthem.                    History provided by:  Patient   used: No        Review of Systems   Constitutional:  Negative for chills and fever.   HENT:  Positive for dental problem and facial swelling. Negative for  ear pain and sore throat.    Eyes:  Negative for pain and visual disturbance.   Respiratory:  Negative for cough and shortness of breath.    Cardiovascular:  Negative for chest pain and palpitations.   Gastrointestinal:  Negative for abdominal pain and vomiting.   Genitourinary:  Negative for dysuria and hematuria.   Musculoskeletal:  Negative for arthralgias and back pain.   Skin:  Positive for color change (rash). Negative for rash.   Neurological:  Negative for seizures and syncope.   All other systems reviewed and are negative.          Objective       ED Triage Vitals [12/11/24 1956]   Temperature Pulse Blood Pressure Respirations SpO2 Patient Position - Orthostatic VS   98 °F (36.7 °C) 89 137/100 18 100 % Sitting      Temp Source Heart Rate Source BP Location FiO2 (%) Pain Score    Temporal Monitor Left arm -- --      Vitals      Date and Time Temp Pulse SpO2 Resp BP Pain Score FACES Pain Rating User   12/11/24 1956 98 °F (36.7 °C) 89 100 % 18 137/100 -- -- RO            Physical Exam  Vitals and nursing note reviewed.   Constitutional:       General: She is not in acute distress.     Appearance: Normal appearance. She is well-developed.   HENT:      Head: Normocephalic and atraumatic.      Mouth/Throat:      Mouth: Mucous membranes are moist.      Comments: Severe dental disease with cavity on the left lower molar and associated gingival swelling with no fluctuance.  There is definitely swelling at the angle of the mandible on that same side.  No regional lymphadenopathy.  Eyes:      Conjunctiva/sclera: Conjunctivae normal.   Cardiovascular:      Rate and Rhythm: Normal rate and regular rhythm.      Heart sounds: No murmur heard.  Pulmonary:      Effort: Pulmonary effort is normal. No respiratory distress.      Breath sounds: Normal breath sounds.   Abdominal:      Palpations: Abdomen is soft.      Tenderness: There is no abdominal tenderness.   Musculoskeletal:         General: No swelling.      Cervical  back: Neck supple.   Skin:     General: Skin is warm and dry.      Capillary Refill: Capillary refill takes less than 2 seconds.      Comments: Patient has a palpable diffuse rash.  Itchy.  Raised.  Consistent with reactive exanthem versus allergic versus scarlatina.   Neurological:      General: No focal deficit present.      Mental Status: She is alert and oriented to person, place, and time.   Psychiatric:         Mood and Affect: Mood normal.         Thought Content: Thought content normal.         Results Reviewed       None            No orders to display       Procedures    ED Medication and Procedure Management   Prior to Admission Medications   Prescriptions Last Dose Informant Patient Reported? Taking?   FLUoxetine (PROzac) 10 mg capsule  Self No No   Sig: TAKE 1 CAPSULE BY MOUTH EVERY DAY FOR 14 DAYS THEN INCREASE TO 2 CAPSULES DAILY THEREAFTER   Patient not taking: Reported on 2024   Levonorgestrel (Mirena, 52 MG,) 20 MCG/DAY IUD   No No   Si Intra Uterine Device by Intrauterine route once for 1 dose   Multiple Vitamins-Minerals (multivitamin with minerals) tablet  Self Yes No   Sig: Take 1 tablet by mouth daily   busPIRone (BUSPAR) 7.5 mg tablet  Self No No   Sig: TAKE 1 TABLET BY MOUTH 2 TIMES A DAY.   Patient not taking: Reported on 2024   escitalopram (LEXAPRO) 5 mg tablet   No No   Sig: Take 1 tablet (5 mg total) by mouth daily   valACYclovir (VALTREX) 1,000 mg tablet  Self Yes No   Sig: Take 1,000 mg by mouth daily      Facility-Administered Medications: None     Patient's Medications   Discharge Prescriptions    CLINDAMYCIN (CLEOCIN) 300 MG CAPSULE    Take 1 capsule (300 mg total) by mouth 4 (four) times a day for 7 days       Start Date: 2024End Date: 2024       Order Dose: 300 mg       Quantity: 28 capsule    Refills: 0    DIPHENHYDRAMINE (BENADRYL) 25 MG TABLET    Take 2 tablets (50 mg total) by mouth every 6 (six) hours as needed for itching for up to 5 days        Start Date: 12/11/2024End Date: 12/16/2024       Order Dose: 50 mg       Quantity: 20 tablet    Refills: 0    PREDNISONE 50 MG TABLET    Take 1 tablet (50 mg total) by mouth daily       Start Date: 12/11/2024End Date: --       Order Dose: 50 mg       Quantity: 5 tablet    Refills: 0     No discharge procedures on file.  ED SEPSIS DOCUMENTATION   Time reflects when diagnosis was documented in both MDM as applicable and the Disposition within this note       Time User Action Codes Description Comment    12/11/2024  9:04 PM Yobani Joshi [L50.9] Urticaria     12/11/2024  9:04 PM Yobani Joshi [K04.7] Dental abscess                  Yobani Joshi MD  12/11/24 9918

## 2024-12-12 NOTE — DISCHARGE INSTRUCTIONS
A  personal message from Dr. Yobani Joshi,  Thank you so much for allowing me to care for you today.    I pride myself in the care and attention I give all my patients.  I hope you were a witness to this tonight.   If for any reason your condition does not improve or worsens, or you have a question that was not answered during your visit you can feel free to text me on my personal phone #  # [phone].   I will answer to your message and continue your care past your emergency room visit.     Please understand that although you are being discharged because your condition has been deemed stable and able to be managed on an outpatient setting. However your condition may worsen as part of the natural progression of the illness/condition, if this occurs please come back to the emergency department for a repeat evaluation.

## 2025-03-06 ENCOUNTER — TELEPHONE (OUTPATIENT)
Age: 35
End: 2025-03-06

## 2025-03-06 NOTE — TELEPHONE ENCOUNTER
Contacted patient for Talk Therapy  wait list to verify needs of services in attempts to update wait list and offer outside resource guide. Writer verified N/A - NO ANSWER. Writer ESTRELLAM and left callback number [phone]; option 3.    1st call attempt

## 2025-04-04 ENCOUNTER — HOSPITAL ENCOUNTER (EMERGENCY)
Facility: HOSPITAL | Age: 35
Discharge: HOME | End: 2025-04-04
Attending: EMERGENCY MEDICINE
Payer: COMMERCIAL

## 2025-04-04 VITALS
TEMPERATURE: 97.5 F | OXYGEN SATURATION: 97 % | HEART RATE: 95 BPM | DIASTOLIC BLOOD PRESSURE: 62 MMHG | RESPIRATION RATE: 18 BRPM | SYSTOLIC BLOOD PRESSURE: 120 MMHG

## 2025-04-04 DIAGNOSIS — K02.9 DENTAL CARIES: ICD-10-CM

## 2025-04-04 DIAGNOSIS — K08.89 DENTALGIA: ICD-10-CM

## 2025-04-04 DIAGNOSIS — K04.7 DENTAL INFECTION: Primary | ICD-10-CM

## 2025-04-04 LAB
EXT PREGNANCY TEST URINE: NEGATIVE
EXT. CONTROL: NORMAL

## 2025-04-04 PROCEDURE — 96372 THER/PROPH/DIAG INJ SC/IM: CPT

## 2025-04-04 PROCEDURE — 81025 URINE PREGNANCY TEST: CPT | Performed by: PHYSICIAN ASSISTANT

## 2025-04-04 PROCEDURE — 99282 EMERGENCY DEPT VISIT SF MDM: CPT

## 2025-04-04 RX ORDER — CHLORHEXIDINE GLUCONATE ORAL RINSE 1.2 MG/ML
15 SOLUTION DENTAL 2 TIMES DAILY
Qty: 120 ML | Refills: 0 | Status: SHIPPED | OUTPATIENT
Start: 2025-04-04

## 2025-04-04 RX ORDER — NAPROXEN 500 MG/1
500 TABLET ORAL 2 TIMES DAILY WITH MEALS
Qty: 30 TABLET | Refills: 0 | Status: SHIPPED | OUTPATIENT
Start: 2025-04-04

## 2025-04-04 RX ORDER — KETOROLAC TROMETHAMINE 30 MG/ML
15 INJECTION, SOLUTION INTRAMUSCULAR; INTRAVENOUS ONCE
Status: COMPLETED | OUTPATIENT
Start: 2025-04-04 | End: 2025-04-04

## 2025-04-04 RX ORDER — CHLORHEXIDINE GLUCONATE ORAL RINSE 1.2 MG/ML
15 SOLUTION DENTAL EVERY 12 HOURS SCHEDULED
Status: DISCONTINUED | OUTPATIENT
Start: 2025-04-04 | End: 2025-04-04 | Stop reason: HOSPADM

## 2025-04-04 RX ADMIN — KETOROLAC TROMETHAMINE 15 MG: 30 INJECTION, SOLUTION INTRAMUSCULAR; INTRAVENOUS at 14:07

## 2025-04-04 RX ADMIN — AMOXICILLIN AND CLAVULANATE POTASSIUM 1 TABLET: 875; 125 TABLET, FILM COATED ORAL at 14:07

## 2025-04-04 RX ADMIN — CHLORHEXIDINE GLUCONATE 0.12% ORAL RINSE 15 ML: 1.2 LIQUID ORAL at 14:38

## 2025-04-04 NOTE — ED PROVIDER NOTES
Time reflects when diagnosis was documented in both MDM as applicable and the Disposition within this note       Time User Action Codes Description Comment    4/4/2025  1:49 PM Leon Harp Add [K04.7] Dental infection     4/4/2025  1:49 PM Leon Harp Add [K08.89] Dentalgia     4/4/2025  1:49 PM Leon Harp Add [K02.9] Dental caries           ED Disposition       ED Disposition   Discharge    Condition   Stable    Date/Time   Fri Apr 4, 2025  1:48 PM    Comment   Shannen Pacheco discharge to home/self care.                   Assessment & Plan       Medical Decision Making  34-year-old female presenting to the emergency department for evaluation of dental pain the dental pain has been on for last few days.  Said that she lost the feeling of the left bottom dentition at the second molar and called her dentist which she will be having appointment with in about 10 days, however she does not have any antibiotics and the test required her to come into the ED for further evaluation medical management of this and prescription for antibiotics prophylactically prior to their fixation.  She does not have any trismus or complicated signs of odontogenic infection on examination.  She has no pain on palpation of the floor the mouth, the floor of mouth is soft, uvula midline, no signs of Juvencio's angina clinically.  There is mild edema noted to the alveolar process of the left second molar without any fluctuance or induration, suspect early signs of possible cellulitis/inflammatory reaction from the tooth.  Augmentin first dose given here today, the rest is into the pharmacy.  Toradol given for analgesic control.  Peridex mouthwash to sterilize the area.  Dental referral made for her to ensure adequate follow-up and she states that she is able to get follow-up with the referrals placed to [facility] on Monday for further evaluation and definitive management.  ED return precautions vianney with her.  Well-appearing at  discharge no acute distress tolerating p.o.    Amount and/or Complexity of Data Reviewed  Labs: ordered.    Risk  Prescription drug management.             Medications   chlorhexidine (PERIDEX) 0.12 % oral rinse 15 mL (has no administration in time range)   ketorolac (TORADOL) injection 15 mg (15 mg Intramuscular Given 4/4/25 1407)   amoxicillin-clavulanate (AUGMENTIN) 875-125 mg per tablet 1 tablet (1 tablet Oral Given 4/4/25 1407)       ED Risk Strat Scores                            SBIRT 20yo+      Flowsheet Row Most Recent Value   Initial Alcohol Screen: US AUDIT-C     1. How often do you have a drink containing alcohol? 0 Filed at: 04/04/2025 1307   2. How many drinks containing alcohol do you have on a typical day you are drinking?  0 Filed at: 04/04/2025 1307   3b. FEMALE Any Age, or MALE 65+: How often do you have 4 or more drinks on one occassion? 0 Filed at: 04/04/2025 1307   Audit-C Score 0 Filed at: 04/04/2025 1307   LOUIE: How many times in the past year have you...    Used an illegal drug or used a prescription medication for non-medical reasons? Never Filed at: 04/04/2025 1307                            History of Present Illness       Chief Complaint   Patient presents with    Dental Pain     Reports broken tooth in upper L tooth x 2 days. Dentist appt scheduled 4/17. Last took tylenol 1 hr PTA.       Past Medical History:   Diagnosis Date    COVID 08/2021      Past Surgical History:   Procedure Laterality Date    MASS EXCISION Right 7/13/2022    Procedure: EXCISION OF SOFT TISSUE MASS RIGHT THIGH;  Surgeon: Ranjith William DO;  Location: [facility] OR;  Service: General    TUBAL LIGATION        Family History   Problem Relation Age of Onset    Thyroid disease Mother     Hypertension Mother     No Known Problems Father     Thyroid disease Brother     Asthma Son       Social History     Tobacco Use    Smoking status: Never    Smokeless tobacco: Never   Vaping Use    Vaping status: Never Used    Substance Use Topics    Alcohol use: Yes     Comment: occassional    Drug use: No      E-Cigarette/Vaping    E-Cigarette Use Never User       E-Cigarette/Vaping Substances    Nicotine No     THC No     CBD No     Flavoring No     Other No     Unknown No       I have reviewed and agree with the history as documented.       History provided by:  Patient   used: No    Dental Injury  Location:  Lost filling in tooth  Quality:  Aching  Severity:  Moderate  Onset quality:  Gradual  Duration:  3 days  Timing:  Constant  Chronicity:  New  Associated symptoms: no chest pain, no congestion, no fatigue, no fever, no rash, no shortness of breath, no vomiting and no wheezing        Review of Systems   Constitutional:  Negative for chills, fatigue and fever.   HENT:  Positive for dental problem. Negative for congestion.    Respiratory:  Negative for chest tightness, shortness of breath and wheezing.    Cardiovascular:  Negative for chest pain.   Gastrointestinal:  Negative for vomiting.   Skin:  Negative for rash.   All other systems reviewed and are negative.          Objective       ED Triage Vitals   Temperature Pulse Blood Pressure Respirations SpO2 Patient Position - Orthostatic VS   04/04/25 1306 04/04/25 1306 04/04/25 1306 04/04/25 1306 04/04/25 1306 04/04/25 1306   97.5 °F (36.4 °C) 95 120/62 18 97 % Sitting      Temp Source Heart Rate Source BP Location FiO2 (%) Pain Score    04/04/25 1306 04/04/25 1306 04/04/25 1306 -- 04/04/25 1407    Temporal Monitor Left arm  6      Vitals      Date and Time Temp Pulse SpO2 Resp BP Pain Score FACES Pain Rating User   04/04/25 1407 -- -- -- -- -- 6 -- EN   04/04/25 1306 97.5 °F (36.4 °C) 95 97 % 18 120/62 -- -- BS            Physical Exam  Vitals and nursing note reviewed.   Constitutional:       General: She is not in acute distress.     Appearance: Normal appearance. She is well-developed. She is not ill-appearing, toxic-appearing or diaphoretic.   HENT:       Head: Normocephalic and atraumatic.      Nose: No congestion or rhinorrhea.      Mouth/Throat:        Comments: Dental caries noted throughout  Mild gingival erythema and edema noted   No fluctuance or induration  Mucus membranes moist  Floor of mouth soft non tender no neck tenderness or erythema   Eyes:      Conjunctiva/sclera: Conjunctivae normal.   Cardiovascular:      Rate and Rhythm: Normal rate and regular rhythm.      Heart sounds: Normal heart sounds.      No friction rub. No gallop.   Pulmonary:      Effort: Pulmonary effort is normal. No respiratory distress.      Breath sounds: Normal breath sounds. No stridor. No wheezing, rhonchi or rales.   Chest:      Chest wall: No tenderness.   Abdominal:      Palpations: Abdomen is soft.      Tenderness: There is no abdominal tenderness.   Musculoskeletal:         General: No swelling.      Cervical back: Neck supple.   Skin:     General: Skin is warm and dry.      Capillary Refill: Capillary refill takes less than 2 seconds.   Neurological:      Mental Status: She is alert.   Psychiatric:         Mood and Affect: Mood normal.         Results Reviewed       Procedure Component Value Units Date/Time    POCT pregnancy, urine [[id number]]  (Normal) Collected: 25 1345    Lab Status: Final result Updated: 25 1350     EXT Preg Test, Ur Negative     Control Valid            No orders to display       Procedures    ED Medication and Procedure Management   Prior to Admission Medications   Prescriptions Last Dose Informant Patient Reported? Taking?   FLUoxetine (PROzac) 10 mg capsule  Self No No   Sig: TAKE 1 CAPSULE BY MOUTH EVERY DAY FOR 14 DAYS THEN INCREASE TO 2 CAPSULES DAILY THEREAFTER   Patient not taking: Reported on 2024   Levonorgestrel (Mirena, 52 MG,) 20 MCG/DAY IUD   No No   Si Intra Uterine Device by Intrauterine route once for 1 dose   Multiple Vitamins-Minerals (multivitamin with minerals) tablet  Self Yes No   Sig: Take 1 tablet by mouth  daily   busPIRone (BUSPAR) 7.5 mg tablet  Self No No   Sig: TAKE 1 TABLET BY MOUTH 2 TIMES A DAY.   Patient not taking: Reported on 9/17/2024   diphenhydrAMINE (BENADRYL) 25 mg tablet   No No   Sig: Take 2 tablets (50 mg total) by mouth every 6 (six) hours as needed for itching for up to 5 days   escitalopram (LEXAPRO) 5 mg tablet   No No   Sig: Take 1 tablet (5 mg total) by mouth daily   predniSONE 50 mg tablet   No No   Sig: Take 1 tablet (50 mg total) by mouth daily   valACYclovir (VALTREX) 1,000 mg tablet  Self Yes No   Sig: Take 1,000 mg by mouth daily      Facility-Administered Medications: None     Patient's Medications   Discharge Prescriptions    AMOXICILLIN-CLAVULANATE (AUGMENTIN) 875-125 MG PER TABLET    Take 1 tablet by mouth every 12 (twelve) hours for 7 days       Start Date: 4/4/2025  End Date: 4/11/2025       Order Dose: 1 tablet       Quantity: 14 tablet    Refills: 0    CHLORHEXIDINE (PERIDEX) 0.12 % SOLUTION    Apply 15 mL to the mouth or throat 2 (two) times a day       Start Date: 4/4/2025  End Date: --       Order Dose: 15 mL       Quantity: 120 mL    Refills: 0    NAPROXEN (NAPROSYN) 500 MG TABLET    Take 1 tablet (500 mg total) by mouth 2 (two) times a day with meals       Start Date: 4/4/2025  End Date: --       Order Dose: 500 mg       Quantity: 30 tablet    Refills: 0       ED SEPSIS DOCUMENTATION   Time reflects when diagnosis was documented in both MDM as applicable and the Disposition within this note       Time User Action Codes Description Comment    4/4/2025  1:49 PM Leon Harp [K04.7] Dental infection     4/4/2025  1:49 PM Leon Harp Add [K08.89] Dentalgia     4/4/2025  1:49 PM Leon Harp [K02.9] Dental caries                  Leon Harp PA-C  04/04/25 2541

## 2025-04-04 NOTE — Clinical Note
Shannen Pacheco was seen and treated in our emergency department on 4/4/2025.    No restrictions            Diagnosis:     Shannen  may return to work on return date.    She may return on this date: 04/06/2025         If you have any questions or concerns, please don't hesitate to call.      Leon Harp PA-C    ______________________________           _______________          _______________  Hospital Representative                              Date                                Time

## 2025-07-02 ENCOUNTER — OFFICE VISIT (OUTPATIENT)
Age: 35
End: 2025-07-02
Payer: COMMERCIAL

## 2025-07-02 VITALS
BODY MASS INDEX: 39.98 KG/M2 | DIASTOLIC BLOOD PRESSURE: 82 MMHG | HEIGHT: 64 IN | RESPIRATION RATE: 18 BRPM | TEMPERATURE: 98.3 F | WEIGHT: 234.2 LBS | SYSTOLIC BLOOD PRESSURE: 122 MMHG | HEART RATE: 65 BPM | OXYGEN SATURATION: 98 %

## 2025-07-02 DIAGNOSIS — K21.9 GASTROESOPHAGEAL REFLUX DISEASE, UNSPECIFIED WHETHER ESOPHAGITIS PRESENT: ICD-10-CM

## 2025-07-02 DIAGNOSIS — E55.9 VITAMIN D DEFICIENCY: ICD-10-CM

## 2025-07-02 DIAGNOSIS — E66.9 OBESITY (BMI 30-39.9): ICD-10-CM

## 2025-07-02 DIAGNOSIS — Z23 ENCOUNTER FOR IMMUNIZATION: ICD-10-CM

## 2025-07-02 DIAGNOSIS — F32.0 MILD MAJOR DEPRESSION, SINGLE EPISODE (HCC): ICD-10-CM

## 2025-07-02 DIAGNOSIS — F41.9 ANXIETY AND DEPRESSION: ICD-10-CM

## 2025-07-02 DIAGNOSIS — G56.03 BILATERAL CARPAL TUNNEL SYNDROME: ICD-10-CM

## 2025-07-02 DIAGNOSIS — F32.A ANXIETY AND DEPRESSION: ICD-10-CM

## 2025-07-02 DIAGNOSIS — Z00.00 ANNUAL PHYSICAL EXAM: Primary | ICD-10-CM

## 2025-07-02 PROCEDURE — 90471 IMMUNIZATION ADMIN: CPT

## 2025-07-02 PROCEDURE — 90472 IMMUNIZATION ADMIN EACH ADD: CPT

## 2025-07-02 PROCEDURE — 99395 PREV VISIT EST AGE 18-39: CPT

## 2025-07-02 PROCEDURE — 90715 TDAP VACCINE 7 YRS/> IM: CPT

## 2025-07-02 PROCEDURE — 99214 OFFICE O/P EST MOD 30 MIN: CPT

## 2025-07-02 RX ORDER — ESCITALOPRAM OXALATE 5 MG/1
5 TABLET ORAL DAILY
Qty: 30 TABLET | Refills: 1 | Status: SHIPPED | OUTPATIENT
Start: 2025-07-02

## 2025-07-02 RX ORDER — PANTOPRAZOLE SODIUM 20 MG/1
20 TABLET, DELAYED RELEASE ORAL
Qty: 30 TABLET | Refills: 5 | Status: SHIPPED | OUTPATIENT
Start: 2025-07-02 | End: 2025-12-29

## 2025-07-02 NOTE — ASSESSMENT & PLAN NOTE
Patient currently following with [facility] general and bariatric surgery with the plan to undergo bariatric surgery.

## 2025-07-02 NOTE — LETTER
To Whom it May Concern: '      Shannen Pacheco,  1990, is under my medical care. She has the diagnoses of anxiety and mild major depressive disorder. Please reach out with any questions or concerns.       Sincerely,       Stephanie Quintanilla PA-C

## 2025-07-02 NOTE — ASSESSMENT & PLAN NOTE
"Started patient on Lexapro in September.  Patient felt well on this medication, but stopped taking it when she lost her insurance.  She would like to restart Lexapro at this time.  Will plan to restart at 5 mg daily and have her return in 1 month for reevaluation.  Call with any questions or concerns.  Patient is requesting a note stating that she has a diagnoses of anxiety and depression.  She states that she needs this note for \"[employer] community services\" to be able to help get assistance to pay for her gas and bills.  Note written in my chart.  Depression Screening Follow-up Plan: Patient's depression screening was positive with a PHQ-9 score of 8. Patient assessed for underlying major depression. They have no active suicidal ideations. Brief counseling provided and recommend additional follow-up/re-evaluation next office visit.    Orders:  •  escitalopram (LEXAPRO) 5 mg tablet; Take 1 tablet (5 mg total) by mouth daily  "

## 2025-07-02 NOTE — PROGRESS NOTES
"Adult Annual Physical  Name: Shannen Pacheco      : 1990      MRN: [id number]  Encounter Provider: Stephanie Quintanilla PA-C  Encounter Date: 2025   Encounter department: [facility] [place]    :  Assessment & Plan  Annual physical exam  - Discussed healthy diet, lifestyle, and screening recommendations with the patient. Appropriate orders placed.   - recent lab results reviewed and/or lab orders placed as appropriate for monitoring of the patient's chronic conditions or evaluation of acute complaints  - Lab work from 2025 reviewed.  CMP normal, mild elevation in platelet count (persistent and stable compared to labs from 2024), blood counts normal, A1c normal, thyroid and B12 normal.  Vitamin D low.  See plan for vitamin D deficiency below.  - Due for tetanus booster and hpv.  Patient agreeable to get the tetanus booster, but declines other vaccines at this time.       Anxiety and depression  Mild major depression, single episode (HCC)  Started patient on Lexapro in September.  Patient felt well on this medication, but stopped taking it when she lost her insurance.  She would like to restart Lexapro at this time.  Will plan to restart at 5 mg daily and have her return in 1 month for reevaluation.  Call with any questions or concerns.  Patient is requesting a note stating that she has a diagnoses of anxiety and depression.  She states that she needs this note for \"[employer] community services\" to be able to help get assistance to pay for her gas and bills.  Note written in my chart.  Depression Screening Follow-up Plan: Patient's depression screening was positive with a PHQ-9 score of 8. Patient assessed for underlying major depression. They have no active suicidal ideations. Brief counseling provided and recommend additional follow-up/re-evaluation next office visit.    Orders:  •  escitalopram (LEXAPRO) 5 mg tablet; Take 1 tablet (5 mg total) by mouth " daily    Gastroesophageal reflux disease, unspecified whether esophagitis present  Patient has been using Tums daily or every other day for control of acid reflux symptoms.  Recommend starting Protonix to better control symptoms and help reduce the need for Tums.  We will reevaluate symptoms at 1 month follow-up.  Orders:  •  pantoprazole (PROTONIX) 20 mg tablet; Take 1 tablet (20 mg total) by mouth daily before breakfast    Vitamin D deficiency  Recent lab work done by [facility] showed vitamin D deficiency.  Patient is currently taking over-the-counter vitamin D 1000 IU daily.  Recommend rechecking vitamin D now for monitoring.  We will adjust vitamin D dosing based on current levels.  If still significantly low, less than 20, we will plan for high-dose vitamin D for 3 months.  Orders:  •  Vitamin D 25 hydroxy; Future    Bilateral carpal tunnel syndrome  Symptoms consistent with carpal tunnel syndrome.  Tinel test and Phalen's tests positive.  Discussed options such as physical therapy versus follow-up with orthopedics.  Patient would prefer to see an orthopedist, since she has tried home exercises and wearing the brace at night without much improvement in symptoms.  Orders:  •  Ambulatory Referral to Orthopedic Surgery; Future    Obesity (BMI 30-39.9)  Patient currently following with [facility] general and bariatric surgery with the plan to undergo bariatric surgery.         Encounter for immunization    Orders:  •  TDAP VACCINE GREATER THAN OR EQUAL TO 8YO IM        Preventive Screenings:    - Cervical cancer screening: screening up-to-date     Immunizations:  - Immunizations due: Tdap         History of Present Illness     Adult Annual Physical:  Patient presents for annual physical. Shannen presents to the office for annual physical.  She complains of persistent wrist and hand pain.  We briefly discussed this at last office visit, but patient was not able to follow-up because she lost her insurance.  She works for  [employer] and her job involves a lot of typing.  The typing makes the pain worse and she occasionally gets numbness and tingling in her fingers.  She has been wearing a wrist brace at night with some improvement in symptoms.  She is also tried home hand exercises without much improvement in symptoms.  She complains of acid reflux symptoms.  She has been using Tums every day or every other day for control of symptoms.  She is interested in getting back on anxiety/depression medication.  She stopped taking the Lexapro last year when she lost her insurance, but she states that the Lexapro worked really well for her..     Diet and Physical Activity:  - Diet/Nutrition: portion control and limited junk food.  - Exercise: walking and 1-2 times a week on average.    Depression Screening:    - PHQ-9 Score: 8    General Health:  - Sleep: sleeps poorly, 1-3 hours of sleep on average and 4-6 hours of sleep on average.  - Hearing: normal hearing right ear, normal hearing left ear and normal hearing bilateral ears.  - Vision: most recent eye exam < 1 year ago and wears glasses.  - Dental: brushes teeth twice daily.    /GYN Health:  - Follows with GYN: yes.   - Menopause: premenopausal.   - Last menstrual cycle: 4/25/2025.   - History of STDs: yes  - Contraception: tubal ligation.      Advanced Care Planning:  - Has an advanced directive?: no    - Has a durable medical POA?: no      Review of Systems   Constitutional:  Negative for chills and fever.   HENT:  Negative for congestion, ear pain and sore throat.    Eyes:  Negative for pain and visual disturbance.   Respiratory:  Negative for cough and shortness of breath.    Cardiovascular:  Negative for chest pain and palpitations.   Gastrointestinal:  Negative for abdominal pain, constipation, diarrhea and vomiting.   Genitourinary:  Negative for dysuria and hematuria.   Musculoskeletal:  Positive for arthralgias (Bilateral wrists and hands). Negative for back pain and myalgias.  "  Skin:  Negative for color change and rash.   Neurological:  Positive for numbness (Fingers). Negative for dizziness, seizures, syncope and headaches.   All other systems reviewed and are negative.        Objective   /82 (BP Location: Left arm, Patient Position: Sitting, Cuff Size: Large)   Pulse 65   Temp 98.3 °F (36.8 °C) (Tympanic)   Resp 18   Ht 5' 4\" (1.626 m)   Wt 106 kg (234 lb 3.2 oz)   LMP 04/25/2025   SpO2 98%   BMI 40.20 kg/m²     Physical Exam  Vitals and nursing note reviewed.   Constitutional:       General: She is not in acute distress.     Appearance: She is well-developed.   HENT:      Head: Normocephalic and atraumatic.      Right Ear: Tympanic membrane normal.      Left Ear: Tympanic membrane normal.      Nose: Nose normal.      Mouth/Throat:      Mouth: Mucous membranes are moist.      Pharynx: Oropharynx is clear. No oropharyngeal exudate.     Eyes:      Extraocular Movements: Extraocular movements intact.      Conjunctiva/sclera: Conjunctivae normal.       Cardiovascular:      Rate and Rhythm: Normal rate and regular rhythm.      Heart sounds: Normal heart sounds. No murmur heard.     No friction rub. No gallop.   Pulmonary:      Effort: Pulmonary effort is normal. No respiratory distress.      Breath sounds: Normal breath sounds. No wheezing, rhonchi or rales.   Abdominal:      Palpations: Abdomen is soft.      Tenderness: There is no abdominal tenderness.     Musculoskeletal:         General: No swelling.      Right wrist: No swelling or deformity. Normal range of motion.      Left wrist: No swelling or deformity. Normal range of motion.      Right hand: Normal strength. Decreased sensation.      Left hand: Normal strength. Decreased sensation.      Cervical back: Neck supple.      Comments: Positive Tinel's test bilaterally (pain in the wrist and numbness in the third fingers bilaterally)  Positive Phalen's test with reproduction of numbness and tingling in most fingers of " both hands.     Skin:     General: Skin is warm and dry.      Capillary Refill: Capillary refill takes less than 2 seconds.     Neurological:      General: No focal deficit present.      Mental Status: She is alert.     Psychiatric:         Mood and Affect: Mood normal.       Depression Screening Follow-up Plan: Patient's depression screening was positive with a PHQ-9 score of 8. Patient assessed for underlying major depression. They have no active suicidal ideations. Brief counseling provided and recommend additional follow-up/re-evaluation next office visit.

## 2025-07-03 ENCOUNTER — TELEPHONE (OUTPATIENT)
Age: 35
End: 2025-07-03

## 2025-07-03 NOTE — TELEPHONE ENCOUNTER
PA for pantoprazole (PROTONIX) 20 mg tablet SUBMITTED to     via    []CMM-KEY:   []Surescripts-Case ID #   []Availity-Auth ID # NDC #   []Faxed to plan   [x]Other website [facility]-[id number]  []Phone call Case ID #     [x]PA sent as URGENT    All office notes, labs and other pertaining documents and studies sent. Clinical questions answered. Awaiting determination from insurance company.     Turnaround time for your insurance to make a decision on your Prior Authorization can take 7-21 business days.

## 2025-07-03 NOTE — TELEPHONE ENCOUNTER
Stephanie from [facility] called to say they received the prior authorization but this medication is preferred on PDL and does not need a review.  She also mentioned she sees a claim was paid from pharmacy as of yesterday and she ran a future claim with no problem for 7/28.  Patient does not require prior authorization

## 2025-07-11 ENCOUNTER — TELEPHONE (OUTPATIENT)
Dept: OBGYN CLINIC | Facility: MEDICAL CENTER | Age: 35
End: 2025-07-11

## 2025-07-11 NOTE — TELEPHONE ENCOUNTER
LVM for pt. Regarding her NP appt. With Dr. Hein this Monday 7/14/25 @ 2pm. Due to provider's OR schedule, we need to reschedule the appt. I did inform pt. We can see her earlier in the day before noon on 7/14 or we can reschedule for Wednesday 6/16/Thursday 7/17. I left the pt. With our central line [phone] to call back and reschedule as explained above. Thank you.

## 2025-08-05 ENCOUNTER — OFFICE VISIT (OUTPATIENT)
Age: 35
End: 2025-08-05
Payer: COMMERCIAL

## 2025-08-05 VITALS
RESPIRATION RATE: 12 BRPM | HEIGHT: 64 IN | DIASTOLIC BLOOD PRESSURE: 68 MMHG | TEMPERATURE: 98 F | WEIGHT: 239.8 LBS | SYSTOLIC BLOOD PRESSURE: 110 MMHG | OXYGEN SATURATION: 98 % | BODY MASS INDEX: 40.94 KG/M2 | HEART RATE: 80 BPM

## 2025-08-05 DIAGNOSIS — K21.9 GASTROESOPHAGEAL REFLUX DISEASE, UNSPECIFIED WHETHER ESOPHAGITIS PRESENT: ICD-10-CM

## 2025-08-05 DIAGNOSIS — F32.0 MILD MAJOR DEPRESSION, SINGLE EPISODE (HCC): ICD-10-CM

## 2025-08-05 DIAGNOSIS — F41.9 ANXIETY AND DEPRESSION: Primary | ICD-10-CM

## 2025-08-05 DIAGNOSIS — F32.A ANXIETY AND DEPRESSION: Primary | ICD-10-CM

## 2025-08-05 PROCEDURE — 99214 OFFICE O/P EST MOD 30 MIN: CPT

## 2025-08-05 RX ORDER — ESCITALOPRAM OXALATE 10 MG/1
10 TABLET ORAL DAILY
Qty: 30 TABLET | Refills: 1 | Status: SHIPPED | OUTPATIENT
Start: 2025-08-05 | End: 2026-07-31

## (undated) DEVICE — INTENDED FOR TISSUE SEPARATION, AND OTHER PROCEDURES THAT REQUIRE A SHARP SURGICAL BLADE TO PUNCTURE OR CUT.: Brand: BARD-PARKER SAFETY BLADES SIZE 15, STERILE

## (undated) DEVICE — GAUZE SPONGES,USP TYPE VII GAUZE, 12 PLY: Brand: CURITY

## (undated) DEVICE — 3M™ TEGADERM™ TRANSPARENT FILM DRESSING FRAME STYLE, 1626W, 4 IN X 4-3/4 IN (10 CM X 12 CM), 50/CT 4CT/CASE: Brand: 3M™ TEGADERM™

## (undated) DEVICE — LIGHT HANDLE COVER SLEEVE DISP BLUE STELLAR

## (undated) DEVICE — DRAPE EQUIPMENT RF WAND

## (undated) DEVICE — SUT VICRYL 3-0 SH 27 IN J416H

## (undated) DEVICE — PAD GROUNDING ADULT

## (undated) DEVICE — NEEDLE 25G X 1 1/2

## (undated) DEVICE — GLOVE SRG BIOGEL 7

## (undated) DEVICE — CHLORAPREP HI-LITE 26ML ORANGE

## (undated) DEVICE — GLOVE INDICATOR PI UNDERGLOVE SZ 7 BLUE

## (undated) DEVICE — SUT MONOCRYL 4-0 PS-2 27 IN Y426H

## (undated) DEVICE — 3M™ STERI-STRIP™ REINFORCED ADHESIVE SKIN CLOSURES, R1541, 1/4 IN X 3 IN (6 MM X 75 MM), 3 STRIPS/ENVELOPE: Brand: 3M™ STERI-STRIP™

## (undated) DEVICE — TUBING SUCTION 5MM X 12 FT

## (undated) DEVICE — BETHLEHEM UNIVERSAL MINOR GEN: Brand: CARDINAL HEALTH

## (undated) DEVICE — POOLE SUCTION HANDLE: Brand: CARDINAL HEALTH